# Patient Record
Sex: FEMALE | Race: WHITE | NOT HISPANIC OR LATINO | Employment: FULL TIME | URBAN - METROPOLITAN AREA
[De-identification: names, ages, dates, MRNs, and addresses within clinical notes are randomized per-mention and may not be internally consistent; named-entity substitution may affect disease eponyms.]

---

## 2020-06-04 DIAGNOSIS — K64.9 HEMORRHOIDS, UNSPECIFIED HEMORRHOID TYPE: Primary | ICD-10-CM

## 2020-06-04 RX ORDER — HYDROCORTISONE ACETATE 25 MG/1
25 SUPPOSITORY RECTAL 2 TIMES DAILY
Qty: 24 SUPPOSITORY | Refills: 0 | Status: SHIPPED | OUTPATIENT
Start: 2020-06-04 | End: 2021-06-04

## 2020-06-04 NOTE — TELEPHONE ENCOUNTER
PT C/O VERY PAINFUL HEMORRHOIDS. STATES SHE CANNOT SLEEP, OR SIT.  PT WANTS MEDICATION CALLED OUT ASAP.

## 2020-12-18 ENCOUNTER — TELEPHONE (OUTPATIENT)
Dept: OBSTETRICS AND GYNECOLOGY | Facility: CLINIC | Age: 35
End: 2020-12-18

## 2020-12-18 NOTE — TELEPHONE ENCOUNTER
----- Message from Zoë Kahn sent at 12/17/2020  2:47 PM CST -----  Regarding: medication  Contact: Patient  Patient would like a call back concerning  having trouble ovulating, please call to advise at  [phone]

## 2020-12-18 NOTE — TELEPHONE ENCOUNTER
Trying to get pregnant x 6 months.  Pt. Has been doing ovulation kit x 2 months and is stating she is not ovulating.  Is there anything her  needs to be doing.  He use to take Testosterone Injections, but does not any more.  Should he get back on Testosterone?  Please advise.

## 2020-12-23 ENCOUNTER — TELEPHONE (OUTPATIENT)
Dept: OBSTETRICS AND GYNECOLOGY | Facility: CLINIC | Age: 35
End: 2020-12-23

## 2020-12-23 DIAGNOSIS — N97.0 ANOVULATION: Primary | ICD-10-CM

## 2020-12-23 NOTE — TELEPHONE ENCOUNTER
PATIENT NOTIFIED  DR GROSS HAS BEEN CONSULTED REGARDIND ATTEMPTING PREGNANCY FOR SIX MONTHS  HE ORDERS  21 DAY PROGESTERONE AND  SEMEN ANALYSIS    DISCUSSED WITH PATIENT  BOTH ORDERS WILL BE SENT TO THE  [facility]

## 2021-01-12 LAB
MULTIPLE ORDERS: NORMAL
PROGEST SERPL-MCNC: 20.6 NG/ML

## 2021-02-11 ENCOUNTER — TELEPHONE (OUTPATIENT)
Dept: OBSTETRICS AND GYNECOLOGY | Facility: CLINIC | Age: 36
End: 2021-02-11

## 2021-02-12 ENCOUNTER — TELEPHONE (OUTPATIENT)
Dept: OBSTETRICS AND GYNECOLOGY | Facility: CLINIC | Age: 36
End: 2021-02-12

## 2021-04-08 ENCOUNTER — OFFICE VISIT (OUTPATIENT)
Dept: OBSTETRICS AND GYNECOLOGY | Facility: CLINIC | Age: 36
End: 2021-04-08
Payer: COMMERCIAL

## 2021-04-08 VITALS — HEART RATE: 84 BPM | SYSTOLIC BLOOD PRESSURE: 115 MMHG | WEIGHT: 150 LBS | DIASTOLIC BLOOD PRESSURE: 75 MMHG

## 2021-04-08 DIAGNOSIS — Z01.419 WELL WOMAN EXAM WITH ROUTINE GYNECOLOGICAL EXAM: Primary | ICD-10-CM

## 2021-04-08 PROCEDURE — 99395 PREV VISIT EST AGE 18-39: CPT | Mod: S$GLB,,, | Performed by: OBSTETRICS & GYNECOLOGY

## 2021-04-08 PROCEDURE — 99395 PR PREVENTIVE VISIT,EST,18-39: ICD-10-PCS | Mod: S$GLB,,, | Performed by: OBSTETRICS & GYNECOLOGY

## 2021-10-07 DIAGNOSIS — B96.89 BV (BACTERIAL VAGINOSIS): Primary | ICD-10-CM

## 2021-10-07 DIAGNOSIS — N76.0 BV (BACTERIAL VAGINOSIS): Primary | ICD-10-CM

## 2021-10-07 RX ORDER — TINIDAZOLE 500 MG/1
2 TABLET ORAL
Qty: 8 TABLET | Refills: 0 | Status: SHIPPED | OUTPATIENT
Start: 2021-10-07 | End: 2021-10-09

## 2021-11-29 DIAGNOSIS — N76.0 BV (BACTERIAL VAGINOSIS): Primary | ICD-10-CM

## 2021-11-29 DIAGNOSIS — B96.89 BV (BACTERIAL VAGINOSIS): Primary | ICD-10-CM

## 2021-11-30 RX ORDER — TINIDAZOLE 500 MG/1
2 TABLET ORAL
Qty: 8 TABLET | Refills: 0 | Status: SHIPPED | OUTPATIENT
Start: 2021-11-30 | End: 2021-12-02

## 2022-04-13 DIAGNOSIS — N89.8 VAGINAL ITCHING: Primary | ICD-10-CM

## 2022-04-13 RX ORDER — IBREXAFUNGERP 150 MG/1
300 TABLET, FILM COATED ORAL 2 TIMES DAILY
Qty: 4 TABLET | Refills: 0 | Status: SHIPPED | OUTPATIENT
Start: 2022-04-13 | End: 2022-04-14

## 2022-12-30 ENCOUNTER — OFFICE VISIT (OUTPATIENT)
Dept: OBSTETRICS AND GYNECOLOGY | Facility: CLINIC | Age: 37
End: 2022-12-30
Payer: COMMERCIAL

## 2022-12-30 VITALS — WEIGHT: 151 LBS | HEART RATE: 92 BPM | SYSTOLIC BLOOD PRESSURE: 131 MMHG | DIASTOLIC BLOOD PRESSURE: 92 MMHG

## 2022-12-30 DIAGNOSIS — F41.9 ANXIETY: ICD-10-CM

## 2022-12-30 DIAGNOSIS — Z01.419 WELL WOMAN EXAM WITH ROUTINE GYNECOLOGICAL EXAM: Primary | ICD-10-CM

## 2022-12-30 DIAGNOSIS — R63.5 WEIGHT GAIN: ICD-10-CM

## 2022-12-30 PROCEDURE — 1159F MED LIST DOCD IN RCRD: CPT | Mod: CPTII,S$GLB,, | Performed by: OBSTETRICS & GYNECOLOGY

## 2022-12-30 PROCEDURE — 99395 PR PREVENTIVE VISIT,EST,18-39: ICD-10-PCS | Mod: S$GLB,,, | Performed by: OBSTETRICS & GYNECOLOGY

## 2022-12-30 PROCEDURE — 3075F SYST BP GE 130 - 139MM HG: CPT | Mod: CPTII,S$GLB,, | Performed by: OBSTETRICS & GYNECOLOGY

## 2022-12-30 PROCEDURE — 3080F PR MOST RECENT DIASTOLIC BLOOD PRESSURE >= 90 MM HG: ICD-10-PCS | Mod: CPTII,S$GLB,, | Performed by: OBSTETRICS & GYNECOLOGY

## 2022-12-30 PROCEDURE — 1159F PR MEDICATION LIST DOCUMENTED IN MEDICAL RECORD: ICD-10-PCS | Mod: CPTII,S$GLB,, | Performed by: OBSTETRICS & GYNECOLOGY

## 2022-12-30 PROCEDURE — 3075F PR MOST RECENT SYSTOLIC BLOOD PRESS GE 130-139MM HG: ICD-10-PCS | Mod: CPTII,S$GLB,, | Performed by: OBSTETRICS & GYNECOLOGY

## 2022-12-30 PROCEDURE — 3080F DIAST BP >= 90 MM HG: CPT | Mod: CPTII,S$GLB,, | Performed by: OBSTETRICS & GYNECOLOGY

## 2022-12-30 PROCEDURE — 99395 PREV VISIT EST AGE 18-39: CPT | Mod: S$GLB,,, | Performed by: OBSTETRICS & GYNECOLOGY

## 2022-12-30 RX ORDER — PHENTERMINE AND TOPIRAMATE 7.5; 46 MG/1; MG/1
1 CAPSULE, EXTENDED RELEASE ORAL EVERY MORNING
Qty: 30 CAPSULE | Refills: 5 | Status: SHIPPED | OUTPATIENT
Start: 2022-12-30 | End: 2023-01-29

## 2022-12-30 RX ORDER — ALPRAZOLAM 0.5 MG/1
0.5 TABLET ORAL 3 TIMES DAILY PRN
Qty: 28 TABLET | Refills: 0 | Status: SHIPPED | OUTPATIENT
Start: 2022-12-30 | End: 2023-12-30

## 2022-12-30 RX ORDER — PHENTERMINE AND TOPIRAMATE 3.75; 23 MG/1; MG/1
1 CAPSULE, EXTENDED RELEASE ORAL DAILY
Qty: 14 CAPSULE | Refills: 0 | Status: SHIPPED | OUTPATIENT
Start: 2022-12-30 | End: 2023-01-13

## 2022-12-30 NOTE — PROGRESS NOTES
Subjective:       Patient ID: Mariam Lee is a 37 y.o. female.    Chief Complaint:  Well Woman      History of Present Illness  She is doing well.  No new health issues,  No abnormal bleeding, No GI or Gu concerns,  No dyspareunia  She is doing well, her cycles are regular but alternate with heaviness and lightness. Her  has been on testosterone for many years. She is concerned with weight gain despite working out and dieting consistently. Qsymia discussed as option to assist with weight loss  .   HPI      GYN & OB History  Patient's last menstrual period was 12/15/2022.     Date of Last Pap: No result found    OB History    Para Term  AB Living   2 1           SAB IAB Ectopic Multiple Live Births                  # Outcome Date GA Lbr Emmett/2nd Weight Sex Delivery Anes PTL Lv   2             1 Para                Review of Systems  Review of Systems   Constitutional:  Negative for activity change.   Eyes:  Negative for visual disturbance.   Respiratory:  Negative for shortness of breath.    Cardiovascular:  Negative for chest pain.   Gastrointestinal:  Negative for abdominal pain.   Genitourinary:  Negative for vaginal bleeding.        No abnormal vaginal bleeding   Musculoskeletal:  Negative for back pain.   Integumentary:  Negative for rash and breast mass.   Neurological:  Negative for numbness.   Psychiatric/Behavioral:          No mood disturbance or changes    Breast: Negative for mass          Objective:    Physical Exam:   Constitutional: She is oriented to person, place, and time. She appears well-developed. She is cooperative.    HENT:   Head: Normocephalic.     Neck: Trachea normal. No thyromegaly present.    Cardiovascular:  Normal rate, regular rhythm and normal heart sounds.             Pulmonary/Chest: Effort normal and breath sounds normal. Right breast exhibits no mass, no nipple discharge and no skin change. Left breast exhibits no mass, no nipple discharge and no  skin change.        Abdominal: Soft. There is no abdominal tenderness. There is no rebound and no guarding.     Genitourinary:    Vagina, uterus, right adnexa and left adnexa normal.      Pelvic exam was performed with patient supine.   Labial bartholins normal.There is no lesion on the right labia. There is no lesion on the left labia. Cervix is normal. Right adnexum displays no mass and no tenderness. Left adnexum displays no mass and no tenderness. Cervix exhibits no discharge. Uterus is not enlarged and not tender.              Lymphadenopathy:        Head (right side): No submental and no submandibular adenopathy present.        Head (left side): No submental and no submandibular adenopathy present.     She has no cervical adenopathy.    Neurological: She is alert and oriented to person, place, and time.    Skin: Skin is warm.    Psychiatric: She has a normal mood and affect. Her speech is normal and behavior is normal. Thought content normal.        Assessment:        1. Well woman exam with routine gynecological exam    2. Weight gain    3. Anxiety                 Plan:             Pap discussed will return for her annual     Pt is aware we call all results. If she does not hear from our office regarding her result within a week of having a study or procedure performed she is to call the office so that we can research the result for her.  Birth control discussed  Chaperone was present

## 2023-01-03 LAB — Lab: NORMAL

## 2023-01-04 DIAGNOSIS — N76.0 BACTERIAL VAGINOSIS: Primary | ICD-10-CM

## 2023-01-04 DIAGNOSIS — B96.89 BACTERIAL VAGINOSIS: Primary | ICD-10-CM

## 2023-01-04 RX ORDER — TINIDAZOLE 500 MG/1
2 TABLET ORAL
Qty: 8 TABLET | Refills: 0 | Status: SHIPPED | OUTPATIENT
Start: 2023-01-04 | End: 2023-01-06

## 2023-01-04 NOTE — TELEPHONE ENCOUNTER
----- Message from Harshil Caballero III, MD sent at 1/4/2023  2:44 PM CST -----  I want to let you know that your pap smear is normal   She has a BV and needs tindamax

## 2023-01-04 NOTE — TELEPHONE ENCOUNTER
Current BV infection. Allergies reviewed. Pharmacy up to date. Medication pending. Please approve.

## 2023-01-18 DIAGNOSIS — Z01.419 WELL WOMAN EXAM WITH ROUTINE GYNECOLOGICAL EXAM: Primary | ICD-10-CM

## 2023-01-23 DIAGNOSIS — R35.0 URINARY FREQUENCY: Primary | ICD-10-CM

## 2023-01-23 RX ORDER — NITROFURANTOIN 25; 75 MG/1; MG/1
100 CAPSULE ORAL 2 TIMES DAILY
Qty: 14 CAPSULE | Refills: 0 | Status: SHIPPED | OUTPATIENT
Start: 2023-01-23 | End: 2023-01-30

## 2023-01-23 NOTE — TELEPHONE ENCOUNTER
C/o urinary urgency and frequency. Sent order to path lab and advised pt that we would send out antibiotics for her to start after she leaves the sample.

## 2023-01-25 DIAGNOSIS — N76.0 ACUTE VAGINITIS: Primary | ICD-10-CM

## 2023-01-25 DIAGNOSIS — N89.8 VAGINAL DISCHARGE: ICD-10-CM

## 2023-01-25 DIAGNOSIS — N76.0 ACUTE VAGINITIS: ICD-10-CM

## 2023-01-25 DIAGNOSIS — R30.0 DYSURIA: Primary | ICD-10-CM

## 2023-01-25 RX ORDER — FLUCONAZOLE 150 MG/1
150 TABLET ORAL EVERY OTHER DAY
Qty: 2 TABLET | Refills: 2 | Status: SHIPPED | OUTPATIENT
Start: 2023-01-25 | End: 2023-01-28

## 2023-01-27 LAB
AMORPH URATE CRY URNS QL MICRO: ABNORMAL
BACTERIA #/AREA URNS HPF: ABNORMAL /[HPF]
BILIRUB UR QL STRIP: NEGATIVE
CHLAMYDIA: NEGATIVE
CLARITY UR: ABNORMAL
COLOR UR: YELLOW
EPITHELIAL CELLS: ABNORMAL
GLUCOSE (UA): NEGATIVE MG/DL
GONORRHEA: NEGATIVE
KETONES UR QL STRIP: 15 MG/DL
LEUKOCYTE ESTERASE UR QL STRIP: ABNORMAL
MUCOUS THREADS URNS QL MICRO: NEGATIVE
NITRITE UR QL STRIP: POSITIVE
OCCULT BLOOD: ABNORMAL
PH, URINE: 5 (ref 5–7.5)
PROT UR QL STRIP: 75 MG/DL
RBC/HPF: ABNORMAL
SOURCE: NORMAL
SP GR UR STRIP: 1.02 (ref 1–1.03)
URINE CULTURE, ROUTINE: NORMAL
UROBILINOGEN, URINE: NORMAL E.U./DL (ref 0–1)
WBC/HPF: ABNORMAL

## 2023-01-31 ENCOUNTER — OFFICE VISIT (OUTPATIENT)
Dept: OBSTETRICS AND GYNECOLOGY | Facility: CLINIC | Age: 38
End: 2023-01-31
Payer: COMMERCIAL

## 2023-01-31 VITALS — DIASTOLIC BLOOD PRESSURE: 103 MMHG | HEART RATE: 81 BPM | WEIGHT: 146 LBS | SYSTOLIC BLOOD PRESSURE: 142 MMHG

## 2023-01-31 DIAGNOSIS — N89.8 VAGINAL DISCHARGE: Primary | ICD-10-CM

## 2023-01-31 DIAGNOSIS — N76.0 ACUTE VAGINITIS: ICD-10-CM

## 2023-01-31 PROCEDURE — 99213 OFFICE O/P EST LOW 20 MIN: CPT | Mod: S$GLB,,, | Performed by: OBSTETRICS & GYNECOLOGY

## 2023-01-31 PROCEDURE — 99213 PR OFFICE/OUTPT VISIT, EST, LEVL III, 20-29 MIN: ICD-10-PCS | Mod: S$GLB,,, | Performed by: OBSTETRICS & GYNECOLOGY

## 2023-01-31 PROCEDURE — 3077F PR MOST RECENT SYSTOLIC BLOOD PRESSURE >= 140 MM HG: ICD-10-PCS | Mod: CPTII,S$GLB,, | Performed by: OBSTETRICS & GYNECOLOGY

## 2023-01-31 PROCEDURE — 1159F MED LIST DOCD IN RCRD: CPT | Mod: CPTII,S$GLB,, | Performed by: OBSTETRICS & GYNECOLOGY

## 2023-01-31 PROCEDURE — 3077F SYST BP >= 140 MM HG: CPT | Mod: CPTII,S$GLB,, | Performed by: OBSTETRICS & GYNECOLOGY

## 2023-01-31 PROCEDURE — 3080F PR MOST RECENT DIASTOLIC BLOOD PRESSURE >= 90 MM HG: ICD-10-PCS | Mod: CPTII,S$GLB,, | Performed by: OBSTETRICS & GYNECOLOGY

## 2023-01-31 PROCEDURE — 1159F PR MEDICATION LIST DOCUMENTED IN MEDICAL RECORD: ICD-10-PCS | Mod: CPTII,S$GLB,, | Performed by: OBSTETRICS & GYNECOLOGY

## 2023-01-31 PROCEDURE — 3080F DIAST BP >= 90 MM HG: CPT | Mod: CPTII,S$GLB,, | Performed by: OBSTETRICS & GYNECOLOGY

## 2023-01-31 RX ORDER — FLUCONAZOLE 150 MG/1
150 TABLET ORAL DAILY
Qty: 1 TABLET | Refills: 0 | Status: SHIPPED | OUTPATIENT
Start: 2023-01-31 | End: 2023-02-01

## 2023-01-31 RX ORDER — CLINDAMYCIN PHOSPHATE 100 MG/5G
CREAM VAGINAL 2 TIMES DAILY
Qty: 5 G | Refills: 1 | Status: SHIPPED | OUTPATIENT
Start: 2023-01-31 | End: 2023-03-07

## 2023-01-31 NOTE — PROGRESS NOTES
Subjective:       Patient ID: Mariam Lee is a 37 y.o. female.    Chief Complaint:  Vaginal Pain (PATIENT C/O BV, YEAST INFECTION, AND UTI RECENTLY. SHE IS STILL EXPERIENCING SYMPTOMS AND STATES SHE HAS TAKEN MEDICATION TO TREAT ALL. )      History of Present Illness  She is here to evaluate for recurrent yeast and BV   she has       GYN & OB History  Patient's last menstrual period was 01/15/2023.     Date of Last Pap: No result found    OB History    Para Term  AB Living   2 1           SAB IAB Ectopic Multiple Live Births                  # Outcome Date GA Lbr Emmett/2nd Weight Sex Delivery Anes PTL Lv   2             1 Para                Review of Systems  Review of Systems          Objective:    Physical Exam:   Constitutional: She appears well-developed.               Genitourinary:    Uterus normal.      Pelvic exam was performed with patient supine.   Labial bartholins normal.There is no tenderness or lesion on the right labia. There is no tenderness or lesion on the left labia. Cervix is normal. There is vaginal discharge in the vagina.               Neurological: She is alert.          Assessment:      No diagnosis found.             Plan:             Pap discussed will return for her annual     Pt is aware we call all results. If she does not hear from our office regarding her result within a week of having a study or procedure performed she is to call the office so that we can research the result for her.  Discussed follow up.  She is to rtn if her symptoms do not resolve or if persist  Chaperone was present

## 2023-02-01 ENCOUNTER — TELEPHONE (OUTPATIENT)
Dept: OBSTETRICS AND GYNECOLOGY | Facility: CLINIC | Age: 38
End: 2023-02-01

## 2023-02-01 RX ORDER — CLINDAMYCIN PHOSPHATE 100 MG/5G
CREAM VAGINAL 2 TIMES DAILY
Qty: 5 G | Refills: 0 | Status: CANCELLED | OUTPATIENT
Start: 2023-02-01

## 2023-02-01 RX ORDER — CLINDAMYCIN PHOSPHATE 100 MG/5G
CREAM VAGINAL 2 TIMES DAILY
Qty: 5 G | Refills: 1 | Status: CANCELLED | OUTPATIENT
Start: 2023-02-01

## 2023-02-03 DIAGNOSIS — A49.8 E COLI INFECTION: Primary | ICD-10-CM

## 2023-02-03 DIAGNOSIS — N76.0 BACTERIAL VAGINOSIS: ICD-10-CM

## 2023-02-03 DIAGNOSIS — B96.89 BACTERIAL VAGINOSIS: ICD-10-CM

## 2023-02-03 LAB — CULTURE: NORMAL

## 2023-02-03 RX ORDER — FLUCONAZOLE 150 MG/1
150 TABLET ORAL EVERY OTHER DAY
Qty: 2 TABLET | Refills: 2 | Status: SHIPPED | OUTPATIENT
Start: 2023-02-03 | End: 2023-02-06

## 2023-02-03 RX ORDER — METRONIDAZOLE 500 MG/1
500 TABLET ORAL EVERY 12 HOURS
Qty: 14 TABLET | Refills: 0 | Status: SHIPPED | OUTPATIENT
Start: 2023-02-03 | End: 2023-02-10

## 2023-02-03 NOTE — TELEPHONE ENCOUNTER
Current E. Coli and BV infection.  Allergies reviewed. Pharmacy up to date. Medication pending. Please approve.

## 2023-02-07 DIAGNOSIS — A49.8 ESCHERICHIA COLI (E. COLI) INFECTION: Primary | ICD-10-CM

## 2023-02-07 RX ORDER — CIPROFLOXACIN 500 MG/1
500 TABLET ORAL EVERY 12 HOURS
Qty: 14 TABLET | Refills: 0 | Status: SHIPPED | OUTPATIENT
Start: 2023-02-07 | End: 2023-02-14

## 2023-02-07 NOTE — TELEPHONE ENCOUNTER
----- Message from Priyanka Ying MA sent at 2/7/2023 10:55 AM CST -----  Patient said to send out the cipro 500mg BID to her regular pharmacy because she will be back home tomorrow. [facility] [address] please

## 2023-02-09 ENCOUNTER — TELEPHONE (OUTPATIENT)
Dept: OBSTETRICS AND GYNECOLOGY | Facility: CLINIC | Age: 38
End: 2023-02-09
Payer: COMMERCIAL

## 2023-02-09 NOTE — TELEPHONE ENCOUNTER
Spoke to patient. She is still having a lot of vaginal irritation and itching. She just picked up the cipro today and will start that. She did not get the clinda cream because she does not currently have health insurance coverage. She will use otc monistat. Patient advised to stop the miconazole.

## 2023-03-07 ENCOUNTER — OFFICE VISIT (OUTPATIENT)
Dept: OBSTETRICS AND GYNECOLOGY | Facility: CLINIC | Age: 38
End: 2023-03-07
Payer: COMMERCIAL

## 2023-03-07 VITALS — WEIGHT: 142 LBS | HEART RATE: 76 BPM | SYSTOLIC BLOOD PRESSURE: 133 MMHG | DIASTOLIC BLOOD PRESSURE: 81 MMHG

## 2023-03-07 DIAGNOSIS — N76.1 CHRONIC VAGINITIS: Primary | ICD-10-CM

## 2023-03-07 PROCEDURE — 1159F PR MEDICATION LIST DOCUMENTED IN MEDICAL RECORD: ICD-10-PCS | Mod: CPTII,S$GLB,, | Performed by: OBSTETRICS & GYNECOLOGY

## 2023-03-07 PROCEDURE — 99213 PR OFFICE/OUTPT VISIT, EST, LEVL III, 20-29 MIN: ICD-10-PCS | Mod: S$GLB,,, | Performed by: OBSTETRICS & GYNECOLOGY

## 2023-03-07 PROCEDURE — 3079F DIAST BP 80-89 MM HG: CPT | Mod: CPTII,S$GLB,, | Performed by: OBSTETRICS & GYNECOLOGY

## 2023-03-07 PROCEDURE — 1159F MED LIST DOCD IN RCRD: CPT | Mod: CPTII,S$GLB,, | Performed by: OBSTETRICS & GYNECOLOGY

## 2023-03-07 PROCEDURE — 3075F PR MOST RECENT SYSTOLIC BLOOD PRESS GE 130-139MM HG: ICD-10-PCS | Mod: CPTII,S$GLB,, | Performed by: OBSTETRICS & GYNECOLOGY

## 2023-03-07 PROCEDURE — 3075F SYST BP GE 130 - 139MM HG: CPT | Mod: CPTII,S$GLB,, | Performed by: OBSTETRICS & GYNECOLOGY

## 2023-03-07 PROCEDURE — 99213 OFFICE O/P EST LOW 20 MIN: CPT | Mod: S$GLB,,, | Performed by: OBSTETRICS & GYNECOLOGY

## 2023-03-07 PROCEDURE — 3079F PR MOST RECENT DIASTOLIC BLOOD PRESSURE 80-89 MM HG: ICD-10-PCS | Mod: CPTII,S$GLB,, | Performed by: OBSTETRICS & GYNECOLOGY

## 2023-03-07 RX ORDER — FLUCONAZOLE 150 MG/1
150 TABLET ORAL DAILY
Qty: 4 TABLET | Refills: 0 | Status: SHIPPED | OUTPATIENT
Start: 2023-03-07 | End: 2023-03-11

## 2023-03-07 RX ORDER — METRONIDAZOLE 500 MG/1
500 TABLET ORAL EVERY 12 HOURS
Qty: 14 TABLET | Refills: 0 | Status: SHIPPED | OUTPATIENT
Start: 2023-03-07 | End: 2023-03-14

## 2023-03-07 NOTE — PROGRESS NOTES
Subjective:       Patient ID: Mariam Lee is a 37 y.o. female.    Chief Complaint:  Vaginitis (PATIENT C/O RECURRENT VAGINAL INFECTION. SHE HAS BEEN TREATED WITH MEDICATION AND NOTHING SEEMS TO CLEAR HER SYMPTOMS UP COMPLETELY. )      History of Present Illness  She is here to evaluate chronic vaginitis. She reports a terrible smell after her period ends. She has tried boric acid suppositories, clarivee, cipro with no improvement      GYN & OB History  Patient's last menstrual period was 2023.     Date of Last Pap: No result found    OB History    Para Term  AB Living   2 1           SAB IAB Ectopic Multiple Live Births                  # Outcome Date GA Lbr Emmett/2nd Weight Sex Delivery Anes PTL Lv   2             1 Para                Review of Systems  Review of Systems   Constitutional:  Negative for activity change.   Eyes:  Negative for visual disturbance.   Respiratory:  Negative for shortness of breath.    Cardiovascular:  Negative for chest pain.   Gastrointestinal:  Negative for abdominal pain.   Genitourinary:  Positive for vaginal discharge and vaginal odor. Negative for vaginal bleeding.        No abnormal vaginal bleeding   Musculoskeletal:  Negative for back pain.   Integumentary:  Negative for rash and breast mass.   Neurological:  Negative for numbness.   Psychiatric/Behavioral:          No mood disturbance or changes    Breast: Negative for mass          Objective:    Physical Exam:   Constitutional: Vital signs are normal.             Abdominal: Soft. She exhibits no distension. There is no abdominal tenderness.     Genitourinary:    Uterus normal.   Labial bartholins normal.There is no rash or tenderness on the right labia. There is no rash or tenderness on the left labia. There is vaginal discharge (thin, grey) in the vagina. No bleeding in the vagina. Cervix exhibits no discharge and no friability. Uterus is not enlarged.                      Assessment:        1.  Chronic vaginitis                 Plan:           One swab & aerobic culture   Pap discussed will return for her annual     Pt is aware we call all results. If she does not hear from our office regarding her result within a week of having a study or procedure performed she is to call the office so that we can research the result for her.  Discussed follow up.  She is to rtn if her symptoms do not resolve or if persist  Chaperone was present

## 2023-03-10 DIAGNOSIS — B37.31 YEAST VAGINITIS: Primary | ICD-10-CM

## 2023-03-11 LAB — CULTURE: NORMAL

## 2023-03-13 ENCOUNTER — OFFICE VISIT (OUTPATIENT)
Dept: OBSTETRICS AND GYNECOLOGY | Facility: CLINIC | Age: 38
End: 2023-03-13
Payer: COMMERCIAL

## 2023-03-13 VITALS — WEIGHT: 140 LBS | SYSTOLIC BLOOD PRESSURE: 122 MMHG | HEART RATE: 96 BPM | DIASTOLIC BLOOD PRESSURE: 83 MMHG

## 2023-03-13 DIAGNOSIS — A49.02 MRSA INFECTION: Primary | ICD-10-CM

## 2023-03-13 PROCEDURE — 1159F MED LIST DOCD IN RCRD: CPT | Mod: CPTII,S$GLB,, | Performed by: OBSTETRICS & GYNECOLOGY

## 2023-03-13 PROCEDURE — 3079F PR MOST RECENT DIASTOLIC BLOOD PRESSURE 80-89 MM HG: ICD-10-PCS | Mod: CPTII,S$GLB,, | Performed by: OBSTETRICS & GYNECOLOGY

## 2023-03-13 PROCEDURE — 1159F PR MEDICATION LIST DOCUMENTED IN MEDICAL RECORD: ICD-10-PCS | Mod: CPTII,S$GLB,, | Performed by: OBSTETRICS & GYNECOLOGY

## 2023-03-13 PROCEDURE — 3074F SYST BP LT 130 MM HG: CPT | Mod: CPTII,S$GLB,, | Performed by: OBSTETRICS & GYNECOLOGY

## 2023-03-13 PROCEDURE — 3074F PR MOST RECENT SYSTOLIC BLOOD PRESSURE < 130 MM HG: ICD-10-PCS | Mod: CPTII,S$GLB,, | Performed by: OBSTETRICS & GYNECOLOGY

## 2023-03-13 PROCEDURE — 3079F DIAST BP 80-89 MM HG: CPT | Mod: CPTII,S$GLB,, | Performed by: OBSTETRICS & GYNECOLOGY

## 2023-03-13 PROCEDURE — 99213 OFFICE O/P EST LOW 20 MIN: CPT | Mod: S$GLB,,, | Performed by: OBSTETRICS & GYNECOLOGY

## 2023-03-13 PROCEDURE — 99213 PR OFFICE/OUTPT VISIT, EST, LEVL III, 20-29 MIN: ICD-10-PCS | Mod: S$GLB,,, | Performed by: OBSTETRICS & GYNECOLOGY

## 2023-03-13 NOTE — PROGRESS NOTES
She took the cipro in feb    the MRSA is present but she feels so much better and reluctant to take an  ABX will come today to see if the culture shows that it has resolved

## 2023-03-14 NOTE — PROGRESS NOTES
Subjective:       Patient ID: Mariam Lee is a 37 y.o. female.    Chief Complaint:  Vaginitis      History of Present Illness  She is here to evaluate vaginitis   she had a positive MRSA   she wants to see if the bacteria has resolved       GYN & OB History  Patient's last menstrual period was 2023.     Date of Last Pap: No result found    OB History    Para Term  AB Living   2 1           SAB IAB Ectopic Multiple Live Births                  # Outcome Date GA Lbr Emmett/2nd Weight Sex Delivery Anes PTL Lv   2             1 Para                Review of Systems  Review of Systems          Objective:    Physical Exam:               Genitourinary:    Vagina, right adnexa and left adnexa normal.      Pelvic exam was performed with patient supine.   The external female genitalia was normal.   There is no lesion on the right labia. There is no lesion on the left labia. Cervix is normal. No  no vaginal discharge (cx preformed) in the vagina.                      Assessment:        1. MRSA infection                 Plan:           Culture preformed   Pt is aware we call all results. If she does not hear from our office regarding her result within a week of having a study or procedure performed she is to call the office so that we can research the result for her.  Discussed follow up.  She is to rtn if her symptoms do not resolve or if persist  Chaperone was present

## 2023-03-17 LAB — CULTURE: NORMAL

## 2023-03-17 RX ORDER — AZITHROMYCIN 500 MG/1
500 TABLET, FILM COATED ORAL DAILY
Qty: 5 TABLET | Refills: 0 | Status: SHIPPED | OUTPATIENT
Start: 2023-03-17 | End: 2023-03-22

## 2023-03-17 RX ORDER — AMOXICILLIN AND CLAVULANATE POTASSIUM 875; 125 MG/1; MG/1
1 TABLET, FILM COATED ORAL 2 TIMES DAILY
Qty: 14 TABLET | Refills: 0 | Status: SHIPPED | OUTPATIENT
Start: 2023-03-17 | End: 2023-03-24

## 2023-03-17 RX ORDER — FLUCONAZOLE 100 MG/1
100 TABLET ORAL DAILY
Qty: 7 TABLET | Refills: 0 | Status: SHIPPED | OUTPATIENT
Start: 2023-03-17 | End: 2023-03-24

## 2023-03-24 DIAGNOSIS — N76.0 VAGINAL INFECTION: Primary | ICD-10-CM

## 2023-03-24 RX ORDER — AZITHROMYCIN 500 MG/1
500 TABLET, FILM COATED ORAL DAILY
Qty: 5 TABLET | Refills: 0 | Status: SHIPPED | OUTPATIENT
Start: 2023-03-24 | End: 2024-03-14

## 2023-03-24 NOTE — TELEPHONE ENCOUNTER
Current vaginal infection. Patient was unable to get zithromax that was previously called she is requesting that we get it sent out again as she is leaving to go out of town today.   Allergies reviewed. Pharmacy up to date. Medication pending. Please approve.

## 2023-04-17 DIAGNOSIS — N76.1 CHRONIC VAGINITIS: Primary | ICD-10-CM

## 2023-04-17 RX ORDER — TINIDAZOLE 500 MG/1
2 TABLET ORAL
Qty: 8 TABLET | Refills: 0 | Status: SHIPPED | OUTPATIENT
Start: 2023-04-17 | End: 2023-04-19

## 2023-05-22 ENCOUNTER — TELEPHONE (OUTPATIENT)
Dept: OBSTETRICS AND GYNECOLOGY | Facility: CLINIC | Age: 38
End: 2023-05-22
Payer: COMMERCIAL

## 2023-05-22 NOTE — TELEPHONE ENCOUNTER
Patient called for work excuse due to recurrent vaginal infection. Excuse given for May, 2, 3, 4, 5, 12, & 19th.

## 2024-01-18 ENCOUNTER — OFFICE VISIT (OUTPATIENT)
Dept: OBSTETRICS AND GYNECOLOGY | Facility: CLINIC | Age: 39
End: 2024-01-18
Payer: COMMERCIAL

## 2024-01-18 VITALS — DIASTOLIC BLOOD PRESSURE: 82 MMHG | HEART RATE: 93 BPM | SYSTOLIC BLOOD PRESSURE: 139 MMHG | WEIGHT: 139 LBS

## 2024-01-18 DIAGNOSIS — Z01.419 WELL WOMAN EXAM WITH ROUTINE GYNECOLOGICAL EXAM: ICD-10-CM

## 2024-01-18 DIAGNOSIS — R87.612 LOW GRADE SQUAMOUS INTRAEPITHELIAL LESION ON CYTOLOGIC SMEAR OF CERVIX (LGSIL): ICD-10-CM

## 2024-01-18 DIAGNOSIS — F41.9 ANXIETY AND DEPRESSION: Primary | ICD-10-CM

## 2024-01-18 DIAGNOSIS — F32.A ANXIETY AND DEPRESSION: Primary | ICD-10-CM

## 2024-01-18 DIAGNOSIS — B96.89 BV (BACTERIAL VAGINOSIS): ICD-10-CM

## 2024-01-18 DIAGNOSIS — N76.0 BV (BACTERIAL VAGINOSIS): ICD-10-CM

## 2024-01-18 PROCEDURE — 3079F DIAST BP 80-89 MM HG: CPT | Mod: CPTII,S$GLB,, | Performed by: OBSTETRICS & GYNECOLOGY

## 2024-01-18 PROCEDURE — 3075F SYST BP GE 130 - 139MM HG: CPT | Mod: CPTII,S$GLB,, | Performed by: OBSTETRICS & GYNECOLOGY

## 2024-01-18 PROCEDURE — 99395 PREV VISIT EST AGE 18-39: CPT | Mod: S$GLB,,, | Performed by: OBSTETRICS & GYNECOLOGY

## 2024-01-18 PROCEDURE — 1159F MED LIST DOCD IN RCRD: CPT | Mod: CPTII,S$GLB,, | Performed by: OBSTETRICS & GYNECOLOGY

## 2024-01-18 RX ORDER — SERTRALINE HYDROCHLORIDE 50 MG/1
TABLET, FILM COATED ORAL
Qty: 30 TABLET | Refills: 2 | Status: SHIPPED | OUTPATIENT
Start: 2024-01-18

## 2024-01-18 RX ORDER — ALPRAZOLAM 0.5 MG/1
0.5 TABLET ORAL 3 TIMES DAILY PRN
Qty: 30 TABLET | Refills: 0 | Status: SHIPPED | OUTPATIENT
Start: 2024-01-18 | End: 2025-01-17

## 2024-01-18 NOTE — PROGRESS NOTES
Subjective:       Patient ID: Mariam Schaefer is a 38 y.o. female.    Chief Complaint:  Well Woman      History of Present Illness  She is doing well.  No new health issues,  No abnormal bleeding, No GI or Gu concerns,  No dyspareunia  She has occ BV no itching or burning but off her cycle she notes it.  She tried the clarivee but not a big difference.   Her anxiety is elevated she feels she needs to do something   she has had a difficult 3 years   but she is now crying everyday and does not sleep well  she feels off no SI or HI   she took xanax in the past but she is needing it more now.  She is with drawn and does not want to go out.  She finds it is worse with her cycle.  She had a panic attack in [place].    .   HPI      GYN & OB History  No LMP recorded.     Date of Last Pap: No result found    OB History    Para Term  AB Living   2 1           SAB IAB Ectopic Multiple Live Births                  # Outcome Date GA Lbr Emmett/2nd Weight Sex Delivery Anes PTL Lv   2             1 Para                Review of Systems  Review of Systems   Psychiatric/Behavioral:  The patient is nervous/anxious.              Objective:    Physical Exam:   Constitutional: She is oriented to person, place, and time. She appears well-developed. She is cooperative.    HENT:   Head: Normocephalic.     Neck: Trachea normal. No thyromegaly present.    Cardiovascular:  Normal rate, regular rhythm and normal heart sounds.             Pulmonary/Chest: Effort normal and breath sounds normal. Right breast exhibits no mass, no nipple discharge and no skin change. Left breast exhibits no mass, no nipple discharge and no skin change.        Abdominal: Soft. There is no abdominal tenderness. There is no rebound and no guarding.     Genitourinary:    Vagina and uterus normal.      Pelvic exam was performed with patient supine.     Labial bartholins normal.There is no lesion on the right labia. There is no lesion on the  left labia. Cervix is normal. Right adnexum displays no mass and no tenderness. Left adnexum displays no mass and no tenderness. Cervix exhibits no discharge. Uterus is not enlarged and not tender.              Lymphadenopathy:        Head (right side): No submental and no submandibular adenopathy present.        Head (left side): No submental and no submandibular adenopathy present.     She has no cervical adenopathy.    Neurological: She is alert and oriented to person, place, and time.    Skin: Skin is warm.    Psychiatric: She has a normal mood and affect. Her speech is normal and behavior is normal. Thought content normal.          Assessment:        1. Anxiety and depression    2. Well woman exam with routine gynecological exam    3. Low grade squamous intraepithelial lesion on cytologic smear of cervix (LGSIL)                 Plan:             Pap preformed    zoloft R&B discussed in detail    follow up 4 weeks discussed will return for her annual     Pt is aware we call all results. If she does not hear from our office regarding her result within a week of having a study or procedure performed she is to call the office so that we can research the result for her.  Birth control discussed  Chaperone was present

## 2024-01-23 LAB — Lab: NORMAL

## 2024-02-28 LAB
ABS NRBC COUNT: 0 X 10 3/UL (ref 0–0.01)
ABSOLUTE BASOPHIL: 0.07 X 10 3/UL (ref 0–0.22)
ABSOLUTE EOSINOPHIL: 0.29 X 10 3/UL (ref 0.04–0.54)
ABSOLUTE IMMATURE GRAN: 0.02 X 10 3/UL (ref 0–0.04)
ABSOLUTE LYMPHOCYTE: 2.6 X 10 3/UL (ref 0.86–4.75)
ABSOLUTE MONOCYTE: 0.72 X 10 3/UL (ref 0.22–1.08)
ALBUMIN SERPL-MCNC: 4.7 G/DL (ref 3.5–5.2)
ALBUMIN/GLOB SERPL ELPH: 1.3 {RATIO} (ref 1–2.7)
ALP ISOS SERPL LEV INH-CCNC: 63 U/L (ref 35–105)
ALT (SGPT): 17 U/L (ref 0–33)
AMORPH URATE CRY URNS QL MICRO: NEGATIVE
ANION GAP SERPL CALC-SCNC: 12 MMOL/L (ref 8–17)
AST SERPL-CCNC: 19 U/L (ref 0–32)
BACTERIA #/AREA URNS HPF: ABNORMAL /[HPF]
BASOPHILS NFR BLD: 0.9 % (ref 0.2–1.2)
BILIRUB UR QL STRIP: NEGATIVE
BILIRUBIN, TOTAL: 1.18 MG/DL (ref 0–1.2)
BUN/CREAT SERPL: 18.3 (ref 6–20)
CALCIUM SERPL-MCNC: 9.8 MG/DL (ref 8.6–10.2)
CARBON DIOXIDE, CO2: 24 MMOL/L (ref 22–29)
CHLORIDE: 103 MMOL/L (ref 98–107)
CLARITY UR: ABNORMAL
COLOR UR: YELLOW
CREAT SERPL-MCNC: 0.8 MG/DL (ref 0.5–0.9)
EOSINOPHIL NFR BLD: 3.6 % (ref 0.7–7)
EPITHELIAL CELLS: ABNORMAL
GFR ESTIMATION: 96.66 ML/MIN/1.73M2
GLOBULIN: 3.5 G/DL (ref 1.5–4.5)
GLUCOSE (UA): NEGATIVE MG/DL
GLUCOSE: 97 MG/DL (ref 74–106)
HCT VFR BLD AUTO: 37.2 % (ref 37–47)
HGB BLD-MCNC: 11.1 G/DL (ref 12–16)
IMMATURE GRANULOCYTES: 0.2 % (ref 0–0.5)
KETONES UR QL STRIP: NEGATIVE MG/DL
LEUKOCYTE ESTERASE UR QL STRIP: ABNORMAL
LYMPHOCYTES NFR BLD: 32 % (ref 19.3–53.1)
MCH RBC QN AUTO: 22.5 PG (ref 27–32)
MCHC RBC AUTO-ENTMCNC: 29.8 G/DL (ref 32–36)
MCV RBC AUTO: 75.5 FL (ref 82–100)
MONOCYTES NFR BLD: 8.9 % (ref 4.7–12.5)
MUCOUS THREADS URNS QL MICRO: ABNORMAL
NEUTROPHILS # BLD AUTO: 4.42 X 10 3/UL (ref 2.15–7.56)
NEUTROPHILS NFR BLD: 54.4 % (ref 34–71.1)
NITRITE UR QL STRIP: NEGATIVE
NUCLEATED RED BLOOD CELLS: 0 /100 WBC (ref 0–0.2)
OCCULT BLOOD: NEGATIVE
PH, URINE: 7 (ref 5–7.5)
PLATELET # BLD AUTO: 327 X 10 3/UL (ref 135–400)
POTASSIUM: 5.7 MMOL/L (ref 3.5–5.1)
PROT SNV-MCNC: 8.2 G/DL (ref 6.4–8.3)
PROT UR QL STRIP: NEGATIVE MG/DL
RBC # BLD AUTO: 4.93 X 10 6/UL (ref 4.2–5.4)
RBC/HPF: NEGATIVE
RDW-SD: 44.2 FL (ref 37–54)
SODIUM: 139 MMOL/L (ref 136–145)
SP GR UR STRIP: 1 (ref 1–1.03)
T4, FREE: 1.15 NG/DL (ref 0.93–1.7)
TSH SERPL DL<=0.005 MIU/L-ACNC: 2.83 UIU/ML (ref 0.27–4.2)
UREA NITROGEN (BUN): 14.6 MG/DL (ref 6–20)
UROBILINOGEN, URINE: NORMAL E.U./DL (ref 0–1)
VITAMIN D (25OHD): 36.8 NG/ML
WBC # BLD: 8.12 X 10 3/UL (ref 4.3–10.8)
WBC/HPF: ABNORMAL

## 2024-03-08 ENCOUNTER — TELEPHONE (OUTPATIENT)
Dept: OBSTETRICS AND GYNECOLOGY | Facility: CLINIC | Age: 39
End: 2024-03-08
Payer: COMMERCIAL

## 2024-03-08 NOTE — TELEPHONE ENCOUNTER
Patient verbalized understanding of lab work. She has some iron coming on Saturday since she has low iron.

## 2024-03-08 NOTE — TELEPHONE ENCOUNTER
----- Message from Harshil Caballero III, MD sent at 3/1/2024 12:52 PM CST -----  Please call her labs look ok   she does need iron ,  her urine looks like a contaminant  her vit d is normal her thyroid is normal and her pap is normal

## 2024-03-11 DIAGNOSIS — N91.2 AMENORRHEA: Primary | ICD-10-CM

## 2024-03-11 LAB — B-HCG SERPL-ACNC: NORMAL MIU/ML

## 2024-03-12 ENCOUNTER — TELEPHONE (OUTPATIENT)
Dept: OBSTETRICS AND GYNECOLOGY | Facility: CLINIC | Age: 39
End: 2024-03-12
Payer: COMMERCIAL

## 2024-03-12 DIAGNOSIS — Z34.90 PREGNANCY, UNSPECIFIED GESTATIONAL AGE: Primary | ICD-10-CM

## 2024-03-14 ENCOUNTER — CLINICAL SUPPORT (OUTPATIENT)
Dept: OBSTETRICS AND GYNECOLOGY | Facility: CLINIC | Age: 39
End: 2024-03-14
Payer: COMMERCIAL

## 2024-03-14 VITALS — DIASTOLIC BLOOD PRESSURE: 70 MMHG | HEART RATE: 80 BPM | WEIGHT: 140 LBS | SYSTOLIC BLOOD PRESSURE: 120 MMHG

## 2024-03-14 DIAGNOSIS — N39.0 URINARY TRACT INFECTION WITHOUT HEMATURIA, SITE UNSPECIFIED: ICD-10-CM

## 2024-03-14 DIAGNOSIS — N39.0 URINARY TRACT INFECTION WITHOUT HEMATURIA, SITE UNSPECIFIED: Primary | ICD-10-CM

## 2024-03-14 DIAGNOSIS — Z3A.01 LESS THAN 8 WEEKS GESTATION OF PREGNANCY: Primary | ICD-10-CM

## 2024-03-14 RX ORDER — CEPHALEXIN 500 MG/1
500 CAPSULE ORAL 3 TIMES DAILY
Qty: 21 CAPSULE | Refills: 0 | Status: SHIPPED | OUTPATIENT
Start: 2024-03-14 | End: 2024-03-21

## 2024-03-14 NOTE — PROGRESS NOTES
OB INTAKE  OB labs discussed with patient  Prenatal Genetic Testing encouraged  She will have UNITY Complete at 11 weeks   Denies vaginal bleeding she does have some cramping  She does have a UTI will treat with Keflex

## 2024-03-15 ENCOUNTER — PATIENT MESSAGE (OUTPATIENT)
Dept: OBSTETRICS AND GYNECOLOGY | Facility: CLINIC | Age: 39
End: 2024-03-15
Payer: COMMERCIAL

## 2024-03-15 LAB
ABS NRBC COUNT: 0 X 10 3/UL (ref 0–0.01)
ABSOLUTE BASOPHIL: 0.06 X 10 3/UL (ref 0–0.22)
ABSOLUTE EOSINOPHIL: 0.2 X 10 3/UL (ref 0.04–0.54)
ABSOLUTE IMMATURE GRAN: 0.02 X 10 3/UL (ref 0–0.04)
ABSOLUTE LYMPHOCYTE: 2.16 X 10 3/UL (ref 0.86–4.75)
ABSOLUTE MONOCYTE: 0.6 X 10 3/UL (ref 0.22–1.08)
AMORPH URATE CRY URNS QL MICRO: NEGATIVE
ANTIBODY SCREEN: NEGATIVE
B-HCG SERPL-ACNC: NORMAL MIU/ML
BACTERIA #/AREA URNS HPF: ABNORMAL /[HPF]
BASOPHILS NFR BLD: 0.9 % (ref 0.2–1.2)
BILIRUB UR QL STRIP: NEGATIVE
BLOOD GROUPING: NORMAL
BLOOD TYPE (D): NEGATIVE
CLARITY UR: ABNORMAL
COLOR UR: YELLOW
EOSINOPHIL NFR BLD: 3 % (ref 0.7–7)
EPITHELIAL CELLS: ABNORMAL
GLUCOSE (UA): NEGATIVE MG/DL
HBV SURFACE AG SERPL QL IA: NONREACTIVE
HCT VFR BLD AUTO: 33.7 % (ref 37–47)
HCV IGG SERPL QL IA: NONREACTIVE
HGB BLD-MCNC: 10.3 G/DL (ref 12–16)
HIV 1+2 AB+HIV1 P24 AG SERPL QL IA: NONREACTIVE
HSV1 IGG SER-ACNC: NONREACTIVE
HSV2 IGG SER-ACNC: NONREACTIVE
IMMATURE GRANULOCYTES: 0.3 % (ref 0–0.5)
KETONES UR QL STRIP: ABNORMAL MG/DL
LEUKOCYTE ESTERASE UR QL STRIP: ABNORMAL
LYMPHOCYTES NFR BLD: 32.5 % (ref 19.3–53.1)
MCH RBC QN AUTO: 22.8 PG (ref 27–32)
MCHC RBC AUTO-ENTMCNC: 30.6 G/DL (ref 32–36)
MCV RBC AUTO: 74.7 FL (ref 82–100)
MONOCYTES NFR BLD: 9 % (ref 4.7–12.5)
MUCOUS THREADS URNS QL MICRO: ABNORMAL
NEUTROPHILS # BLD AUTO: 3.61 X 10 3/UL (ref 2.15–7.56)
NEUTROPHILS NFR BLD: 54.3 % (ref 34–71.1)
NITRITE UR QL STRIP: POSITIVE
NUCLEATED RED BLOOD CELLS: 0 /100 WBC (ref 0–0.2)
OCCULT BLOOD: NEGATIVE
PH, URINE: 5 (ref 5–7.5)
PLATELET # BLD AUTO: 250 X 10 3/UL (ref 135–400)
PROGEST SERPL-MCNC: 20.1 NG/ML
PROT UR QL STRIP: 25 MG/DL
RBC # BLD AUTO: 4.51 X 10 6/UL (ref 4.2–5.4)
RBC/HPF: ABNORMAL
RDW-SD: 44.1 FL (ref 37–54)
RH, WEAK D (DU): NEGATIVE
RUBELLA IGG SCREEN: NORMAL
SP GR UR STRIP: 1.02 (ref 1–1.03)
SYPHILIS TREPONEMAL ANTIBODY: NONREACTIVE
T4, FREE: 1.06 NG/DL (ref 0.93–1.7)
TSH SERPL DL<=0.005 MIU/L-ACNC: 3.71 UIU/ML (ref 0.27–4.2)
URINE CULTURE, ROUTINE: NORMAL
UROBILINOGEN, URINE: 1 E.U./DL (ref 0–1)
WBC # BLD: 6.65 X 10 3/UL (ref 4.3–10.8)
WBC/HPF: ABNORMAL

## 2024-03-18 DIAGNOSIS — Z34.90 PREGNANCY, UNSPECIFIED GESTATIONAL AGE: Primary | ICD-10-CM

## 2024-03-18 RX ORDER — PROGESTERONE 200 MG/1
200 CAPSULE ORAL DAILY
Qty: 30 CAPSULE | Refills: 2 | Status: SHIPPED | OUTPATIENT
Start: 2024-03-18

## 2024-03-18 NOTE — TELEPHONE ENCOUNTER
March 18, 2024  Me   to Mariam Schaefer   RS      3/18/24  5:45 PM  Good evening Mariam,      I spoke with Dr. Caballero regarding taking Progesterone. Your level is 20.1 which is within normal range.  He does not feel you need Progesterone but if it makes you feel better he will send out a prescription.  This Progesterone may cause increased nausea.  Please check with your Pharmacy tomorrow.      Katelynn     This Patient Portal message has not been read.           3/18/24  5:04 PM  Josr Freeman LPN routed this conversation to Me  Josr Freeman LPN   to Mariam Schaefer         3/18/24  4:59 PM  The different options are surfak stool softener, which can be used daily, or colace, doxidan, fleet enema, metamucil, milk of magnesia, citrucel, or fibercon. Whichever you find works best for you.     This Patient Portal message has not been read.           3/18/24  2:04 PM  Magan Gilmore routed this conversation to Josr Freeman LPN Lauren D Tadlock   to [facility] (supporting Josr Freeman LPN)   LT      3/18/24 12:59 PM  Also! What can I take to help me go to the bathroom? I noticed on this iron its been over a week..   March 15, 2024  Ronald Gonzalezel   to Josr Freeman LPN       3/15/24 12:41 PM  This message arrived in the [facility], and has been routed back to you as the original recipient of the message.  Mariam Schaefer   to [facility] (supporting Josr Freeman LPN)   LT      3/15/24 12:09 PM  Pinky Pace!      I was just following up after yesterday. Still concerned that my hcg didnt completely double, but my other question is should I be taking a progesterone supplement at all? My friend group said they all were put on in their 30s immediately so I got worried that maybe I should have I asked that yesterday? If it helps in anyway should I be taking it? Sorry to bother. Just didnt want to go into the weekend it out asking.

## 2024-03-19 DIAGNOSIS — O21.9 NAUSEA AND VOMITING DURING PREGNANCY: Primary | ICD-10-CM

## 2024-03-19 RX ORDER — PROMETHAZINE HYDROCHLORIDE 25 MG/1
25 TABLET ORAL EVERY 6 HOURS PRN
Qty: 18 TABLET | Refills: 1 | Status: SHIPPED | OUTPATIENT
Start: 2024-03-19

## 2024-03-19 NOTE — TELEPHONE ENCOUNTER
Mariam,      I am submitting a medication request to  to treat nausea.  Please check with your Pharmacy this afternoon.  If you are unable to hold fluids down, please go to ER for IV meds and hydration.     Katelynn AUSTIN      Yes! Please. Last night was awful. Sometimes it comes at night worse than during the day!           You  Mariam Schaefer1 hour ago (9:30 AM)     RS      Mariam,      Your Progesterone level is normal. Dr. Caballero does not feel you need to take Progesterone.  Would you like a prescription sent out for nausea?     Magan Buchanan routed conversation to You1 hour ago (9:23 AM)     Mariam Schaefer  [facility] (supporting You)1 hour ago (9:18 AM)     LT Pinky Pace! Im already so sick I can barely function during the day. What do you think is the right thing to do? If I dont need, I dont want to take it.           You  Mariam Schaefer16 hours ago (5:45 PM)     RS      Good evening Mariam,      I spoke with Dr. Caballero regarding taking Progesterone. Your level is 20.1 which is within normal range.  He does not feel you need Progesterone but if it makes you feel better he will send out a prescription.  This Progesterone may cause increased nausea.  Please check with your Pharmacy tomorrow.

## 2024-04-03 ENCOUNTER — PROCEDURE VISIT (OUTPATIENT)
Dept: OBSTETRICS AND GYNECOLOGY | Facility: CLINIC | Age: 39
End: 2024-04-03
Payer: COMMERCIAL

## 2024-04-03 DIAGNOSIS — Z3A.01 LESS THAN 8 WEEKS GESTATION OF PREGNANCY: ICD-10-CM

## 2024-04-03 PROCEDURE — 76801 OB US < 14 WKS SINGLE FETUS: CPT | Mod: S$GLB,,, | Performed by: OBSTETRICS & GYNECOLOGY

## 2024-04-09 ENCOUNTER — PATIENT MESSAGE (OUTPATIENT)
Dept: OBSTETRICS AND GYNECOLOGY | Facility: CLINIC | Age: 39
End: 2024-04-09
Payer: COMMERCIAL

## 2024-04-09 DIAGNOSIS — O21.9 EXCESSIVE VOMITING IN PREGNANCY: Primary | ICD-10-CM

## 2024-04-09 RX ORDER — ONDANSETRON HYDROCHLORIDE 8 MG/1
8 TABLET, FILM COATED ORAL EVERY 8 HOURS PRN
Qty: 30 TABLET | Refills: 1 | Status: SHIPPED | OUTPATIENT
Start: 2024-04-09

## 2024-04-09 NOTE — TELEPHONE ENCOUNTER
"Patient text Josr on her personal cell. She is reporting " I can barely function. I am so sick.  I know It is not advised to take Zofran prior to ten weeks gestation but I can not hold anything down.  I can barely get out of bed.  I am sick all day and all night.   I will reach out to patient to notify her will we send a prescription of Zofran to her pharmacy.  She does need to be reminded of the risk of Fetal Cardiac Abnormalities when taking Zofran in the first trimester.    "

## 2024-04-16 ENCOUNTER — CLINICAL SUPPORT (OUTPATIENT)
Dept: OBSTETRICS AND GYNECOLOGY | Facility: CLINIC | Age: 39
End: 2024-04-16
Payer: COMMERCIAL

## 2024-04-16 ENCOUNTER — INITIAL PRENATAL (OUTPATIENT)
Dept: OBSTETRICS AND GYNECOLOGY | Facility: CLINIC | Age: 39
End: 2024-04-16
Payer: COMMERCIAL

## 2024-04-16 VITALS — DIASTOLIC BLOOD PRESSURE: 83 MMHG | SYSTOLIC BLOOD PRESSURE: 114 MMHG | WEIGHT: 136.63 LBS | HEART RATE: 94 BPM

## 2024-04-16 DIAGNOSIS — Z34.90 PREGNANCY, UNSPECIFIED GESTATIONAL AGE: Primary | ICD-10-CM

## 2024-04-16 DIAGNOSIS — O09.521 AMA (ADVANCED MATERNAL AGE) MULTIGRAVIDA 35+, FIRST TRIMESTER: Primary | ICD-10-CM

## 2024-04-16 PROCEDURE — 0500F INITIAL PRENATAL CARE VISIT: CPT | Mod: CPTII,S$GLB,, | Performed by: OBSTETRICS & GYNECOLOGY

## 2024-04-16 NOTE — PROGRESS NOTES
CHIEF COMPLAINT:   Patient presents with      initial OB        HISTORY OF PRESENT ILLNESS  Mariam Schaefer 38 y.o.  presents for initial OB  The patient has no complaints today.  Nausea discussed and fluid intake including sprite, gator aid, cool aid,    No bleeding or pain.    Genetic testing is discussed in detail with her .   Denies chemical/pesticide/radiation exposure.    OB history:      LMP: Patient's last menstrual period was 2024 (exact date).  EDC: Estimated Date of Delivery: 24  EGA: 11w3d       Health Maintenance   Topic Date Due    Lipid Panel  Never done    TETANUS VACCINE  Never done    Hepatitis C Screening  Completed       Past Medical History:   Diagnosis Date    Anemia     Anxiety disorder, unspecified     Decreased libido     Diffuse cystic mastopathy     Dysmenorrhea     Hormone imbalance     Menorrhagia     PMS (premenstrual syndrome)     Psychosexual dysfunction        Past Surgical History:   Procedure Laterality Date    AUGMENTATION OF BREAST Bilateral      SECTION      2    WISDOM TOOTH EXTRACTION         Family History   Problem Relation Name Age of Onset    No Known Problems Paternal Grandfather      No Known Problems Paternal Grandmother      Pancreatic cancer Maternal Grandmother      No Known Problems Maternal Grandfather      Hypertension Father      Melanoma Mother      No Known Problems Brother      No Known Problems Sister      Breast cancer Neg Hx      Colon cancer Neg Hx      Diabetes Neg Hx      Eclampsia Neg Hx      Miscarriages / Stillbirths Neg Hx       labor Neg Hx      Uterine cancer Neg Hx      Cervical cancer Neg Hx      Stroke Neg Hx      Ovarian cancer Neg Hx         Social History     Socioeconomic History    Marital status:    Tobacco Use    Smoking status: Former     Types: Cigarettes    Smokeless tobacco: Never   Substance and Sexual Activity    Alcohol use: Not Currently     Comment: OCCASIONAL     Drug use: Never     Sexual activity: Yes     Partners: Male       Current Outpatient Medications   Medication Sig Dispense Refill    prenatal 25/iron/folate 6/dha (PRENA1 ORAL) Take by mouth.      ALPRAZolam (XANAX) 0.5 MG tablet Take 1 tablet (0.5 mg total) by mouth 3 (three) times daily as needed for Insomnia or Anxiety. (Patient not taking: Reported on 3/14/2024) 30 tablet 0    boric acid (PH-D) 600 mg vaginal suppository Place 1 suppository (600 mg total) vaginally every evening. As directed (Patient not taking: Reported on 3/14/2024) 30 suppository 3    ondansetron (ZOFRAN) 8 MG tablet Take 1 tablet (8 mg total) by mouth every 8 (eight) hours as needed for Nausea. 30 tablet 1    progesterone (PROMETRIUM) 200 MG capsule Take 1 capsule (200 mg total) by mouth once daily. (Patient not taking: Reported on 4/16/2024) 30 capsule 2    promethazine (PHENERGAN) 25 MG tablet Take 1 tablet (25 mg total) by mouth every 6 (six) hours as needed. 18 tablet 1    sertraline (ZOLOFT) 50 MG tablet Take one half pill for 6 days (Patient not taking: Reported on 3/14/2024) 30 tablet 2     No current facility-administered medications for this visit.       Review of patient's allergies indicates:   Allergen Reactions    Bactrim [sulfamethoxazole-trimethoprim]     Sulfa (sulfonamide antibiotics)          PHYSICAL EXAM   Vitals:    04/16/24 1027   BP: 114/83   Pulse: 94        PAIN SCALE: 0/10 None    PHYSICAL EXAM    ROS:  GENERAL: No fever, chills, fatigability.  CV: Denies chest pain  PULM: Denies shortness of breath or wheezing.  ABDOMEN denies  abdominal pain, hematemesis or blood in stool.  URINARY: No flank pain, dysuria or hematuria.  REPRODUCTIVE: No abnormal vaginal bleeding.       PE:   APPEARANCE: Well nourished, well developed, in no acute distress  CHEST: Clear to auscultation bilaterally  CV: Regular rate and rhythm  BREASTS: Symmetrical, no skin changes or visible lesions. No palpable masses, nipple discharge or adenopathy bilaterally. Mild  bilateral fibrocystic changes  ABDOMEN: Soft. No tenderness or masses. No hepatosplenomegaly. No hernias  PELVIC:   VULVA: No lesions. Normal female genitalia.  URETHRAL MEATUS: Normal size and location, no lesions, no prolapse.  URETHRA: No masses, tenderness, prolapse or scarring.  VAGINA: Moist and well rugated, no discharge, no significant cystocele or rectocele.  CERVIX: No lesions, normal diameter, no stenosis, no cervical motion tenderness.   UTERUS: size, regular shape, mobile, non-tender, normal position, good support.  ADNEXA: No masses, tenderness or CDS nodularity.  ANUS PERINEUM: No lesions, no relaxation, no external hemorrhoids.     UPT +    A/P:     -      Patient was counseled today on A.C.S. Pap guidelines and recommendations for yearly pelvic exams, mammograms and monthly self breast exams; to see her PCP for other health maintenance and pregnancy.   -      Patient's medications and medical history reviewed with patient and implications in pregnancy.   -      Pregnancy course discussed and 'AtoZ' book given. Patient was counseled on proper weight gain based on the [employer] [facility]'s recommendations based on her pre-pregnancy weight. Discussed foods to avoid in pregnancy (i.e. sushi, fish that are high in mercury, deli meat, and unpasteurized cheeses). Discussed prenatal vitamin options (i.e. stool softener, DHA). Discussed potential medical problems in pregnancy.  -     Discussed risk of Toxoplasmosis transmission from pets and reviewed risk reduction techniques.  -     Discused increased risks with AMA status.  She will consider  -     Chromosomal abnormality risk discussed and available testing   -     Pt was counseled on exercise in pregnancy and weight gain recommendations.  -     Pt was counseled on travel recommendations and on risks of Covid 19 virus exposure.    -     Covid prevention reviewed with patient  -     Oriented to practice including call coverage   -     Follow-up routine  OB  labs and u/s-- .   -       Pt is aware we call all results. If she does not hear from our office regarding her result within a week of having a study or procedure performed she is to call the office so that we can research the result for her as I do not know when she is scheduling her procedures.

## 2024-04-19 LAB
CHLAMYDIA: NEGATIVE
GONORRHEA: NEGATIVE
SOURCE: NORMAL

## 2024-05-02 ENCOUNTER — TELEPHONE (OUTPATIENT)
Dept: OBSTETRICS AND GYNECOLOGY | Facility: CLINIC | Age: 39
End: 2024-05-02
Payer: COMMERCIAL

## 2024-05-14 ENCOUNTER — ROUTINE PRENATAL (OUTPATIENT)
Dept: OBSTETRICS AND GYNECOLOGY | Facility: CLINIC | Age: 39
End: 2024-05-14
Payer: COMMERCIAL

## 2024-05-14 VITALS — WEIGHT: 138 LBS | DIASTOLIC BLOOD PRESSURE: 74 MMHG | HEART RATE: 85 BPM | SYSTOLIC BLOOD PRESSURE: 105 MMHG

## 2024-05-14 DIAGNOSIS — Z36.89 ENCOUNTER FOR FETAL ANATOMIC SURVEY: Primary | ICD-10-CM

## 2024-05-14 DIAGNOSIS — Z34.90 PREGNANCY, UNSPECIFIED GESTATIONAL AGE: Primary | ICD-10-CM

## 2024-05-14 PROCEDURE — 0502F SUBSEQUENT PRENATAL CARE: CPT | Mod: CPTII,S$GLB,, | Performed by: OBSTETRICS & GYNECOLOGY

## 2024-05-14 NOTE — PROGRESS NOTES
38 y.o. female  at 15w3d   feeling flutters/FM, denies VB, leaking of fluid or cramping  Doing well   TWG:-2   Reviewed prenatal lab  Anatomy US ordered  Reviewed warning signs, pregnancy precautions and how/when to call.  RTC x 4 wks, call or present sooner prn.   The tono is working she is doing ok with this

## 2024-05-17 ENCOUNTER — TELEPHONE (OUTPATIENT)
Dept: OBSTETRICS AND GYNECOLOGY | Facility: CLINIC | Age: 39
End: 2024-05-17
Payer: COMMERCIAL

## 2024-05-17 NOTE — TELEPHONE ENCOUNTER
Patient text Josr on her cell phone requesting results of a Urinalysis  I called patient and advised her to please reach us through her [facility]  She was seen on Tuesday  I do not see an abnormal urine noted in her chart.   Patient is asymptomatic

## 2024-05-18 ENCOUNTER — PATIENT MESSAGE (OUTPATIENT)
Dept: OTHER | Facility: OTHER | Age: 39
End: 2024-05-18
Payer: COMMERCIAL

## 2024-05-25 ENCOUNTER — PATIENT MESSAGE (OUTPATIENT)
Dept: OTHER | Facility: OTHER | Age: 39
End: 2024-05-25
Payer: COMMERCIAL

## 2024-06-11 ENCOUNTER — ROUTINE PRENATAL (OUTPATIENT)
Dept: OBSTETRICS AND GYNECOLOGY | Facility: CLINIC | Age: 39
End: 2024-06-11
Payer: COMMERCIAL

## 2024-06-11 ENCOUNTER — PROCEDURE VISIT (OUTPATIENT)
Dept: OBSTETRICS AND GYNECOLOGY | Facility: CLINIC | Age: 39
End: 2024-06-11
Payer: COMMERCIAL

## 2024-06-11 VITALS — WEIGHT: 142 LBS | DIASTOLIC BLOOD PRESSURE: 73 MMHG | HEART RATE: 84 BPM | SYSTOLIC BLOOD PRESSURE: 104 MMHG

## 2024-06-11 DIAGNOSIS — Z34.90 PREGNANCY, UNSPECIFIED GESTATIONAL AGE: Primary | ICD-10-CM

## 2024-06-11 DIAGNOSIS — Z36.89 ENCOUNTER FOR FETAL ANATOMIC SURVEY: ICD-10-CM

## 2024-06-11 PROCEDURE — 0503F POSTPARTUM CARE VISIT: CPT | Mod: CPTII,S$GLB,, | Performed by: OBSTETRICS & GYNECOLOGY

## 2024-06-11 PROCEDURE — 76805 OB US >/= 14 WKS SNGL FETUS: CPT | Mod: S$GLB,,, | Performed by: OBSTETRICS & GYNECOLOGY

## 2024-06-11 NOTE — PROGRESS NOTES
38 y.o. female  at 19w3d   Starting to feel flutters/FM, denies VB, leaking of fluid, cramping  Doing well   TW  anatomy US  Reviewed warning signs, normal FM,  labor precautions and how/when to call.  RTC x 4 wks, call or present sooner prn.   Drink plenty of fluids

## 2024-06-14 ENCOUNTER — PATIENT MESSAGE (OUTPATIENT)
Dept: OBSTETRICS AND GYNECOLOGY | Facility: CLINIC | Age: 39
End: 2024-06-14
Payer: COMMERCIAL

## 2024-06-14 ENCOUNTER — E-VISIT (OUTPATIENT)
Dept: FAMILY MEDICINE | Facility: CLINIC | Age: 39
End: 2024-06-14
Payer: COMMERCIAL

## 2024-06-14 DIAGNOSIS — N39.0 URINARY TRACT INFECTION WITHOUT HEMATURIA, SITE UNSPECIFIED: Primary | ICD-10-CM

## 2024-06-14 DIAGNOSIS — O23.42 UTI IN PREGNANCY, ANTEPARTUM, SECOND TRIMESTER: Primary | ICD-10-CM

## 2024-06-14 RX ORDER — AMOXICILLIN 500 MG/1
500 TABLET, FILM COATED ORAL EVERY 8 HOURS
Qty: 15 TABLET | Refills: 0 | Status: SHIPPED | OUTPATIENT
Start: 2024-06-14 | End: 2024-06-14

## 2024-06-14 RX ORDER — CEPHALEXIN 500 MG/1
500 CAPSULE ORAL 3 TIMES DAILY
Qty: 21 CAPSULE | Refills: 0 | Status: SHIPPED | OUTPATIENT
Start: 2024-06-14 | End: 2024-06-21

## 2024-06-14 NOTE — PROGRESS NOTES
Patient ID: Mariam Schaefer is a 38 y.o. female.    Chief Complaint: Urinary Tract Infection (Entered automatically based on patient selection in Patient Portal.)    The patient initiated a request through [facility] on 6/14/2024 for evaluation and management with a chief complaint of Urinary Tract Infection (Entered automatically based on patient selection in Patient Portal.)     I evaluated the questionnaire submission on 6/14/24.    [facility] Peq Evisit Uti Questionnaire    6/14/2024 11:52 AM CDT - Filed by Patient   Do you agree to participate in an E-Visit? Yes   If you have any of the following symptoms, please present to your local emergency room or call 911:  I acknowledge   Are you pregnant, could you be pregnant, or are you breast feeding? Pregnant   What is the main issue you would like addressed today? UTI   What symptoms do you currently have? Pain while passing urine;  Difficulty passing urine;  Change in urine appearance or smell   When did your symptoms first appear? 6/14/2024   List what you have done or taken to help your symptoms. NA   Please indicate whether you have had the following symptoms during the past 24 hours     Urgent urination (a sudden and uncontrollable urge to urinate) Moderate   Frequent urination of small amounts of urine (going to the toilet very often) Moderate   Burning pain when urinating Moderate   Incomplete bladder emptying (still feel like you need to urinate again after urination) Moderate   Pain not associated with urination in the lower abdomen below the belly button) Mild   What does your urine look like? I am not sure   Blood seen in the urine None   Flank pain (pain in one or both sides of the lower back) Moderate   Abnormal Vaginal Discharge (abnormal amount, color and/or odor) None   Discharge from the urethra (urinary opening) without urination None   High body temperature/fever? None-<99.5   Please rate how much discomfort you have experience because of the symptoms in  the past 24 hours: Mild   Please indicate how the symptoms have interfered with your every day activities/work in the past 24 hours: Mild   Please indicate how these symptoms have interfered with your social activities (visiting people, meeting with friends, etc.) in the past 24 hours? Mild   Are you a diabetic? No   Please indicate whether you have the following at the time of completion of this questionnaire: Pregnancy   Provide any additional information you feel is important.    Please attach any relevant images or files (if you have performed a home test for UTI, please submit a photo of results)    Are you able to take your vital signs? No         Encounter Diagnosis   Name Primary?    Urinary tract infection without hematuria, site unspecified Yes        No orders of the defined types were placed in this encounter.           No follow-ups on file.      E-Visit Time Tracking: 15 mins

## 2024-06-14 NOTE — TELEPHONE ENCOUNTER
I am sending an order for a Urinalysis and culture and Sensitivity  to [facility].  Please give a sample today.  I am also submitting a medication request to Dr. Caballero for an antibiotic to treat your symptoms. You may start taking the antibiotic after giving a urine sample.  Increase your fluids, do not hold your urine, void frequently, wipe from front to back and take the entire antibiotic  Please check with [facility] later today.    Katelynn   ===View-only below this line===      ----- Message -----       From:Mariam Schaefer       Sent:6/14/2024 11:58 AM CDT         To:Harshil Caballero III    Subject:UTI    Im pretty sure that I have something going on. May to run by [facility] to get a urine check! If you guys can call me and let me know if thats something I can do today, that would be great! Thank you!

## 2024-07-11 ENCOUNTER — ROUTINE PRENATAL (OUTPATIENT)
Dept: OBSTETRICS AND GYNECOLOGY | Facility: CLINIC | Age: 39
End: 2024-07-11
Payer: COMMERCIAL

## 2024-07-11 VITALS — HEART RATE: 98 BPM | WEIGHT: 148 LBS | DIASTOLIC BLOOD PRESSURE: 80 MMHG | SYSTOLIC BLOOD PRESSURE: 114 MMHG

## 2024-07-11 DIAGNOSIS — Z34.90 PREGNANCY, UNSPECIFIED GESTATIONAL AGE: Primary | ICD-10-CM

## 2024-07-11 DIAGNOSIS — Z3A.23 23 WEEKS GESTATION OF PREGNANCY: ICD-10-CM

## 2024-07-11 LAB
BILIRUB SERPL-MCNC: NORMAL MG/DL
BLOOD URINE, POC: NORMAL
CLARITY, POC UA: CLEAR
COLOR, POC UA: YELLOW
GLUCOSE UR QL STRIP: NORMAL
KETONES UR QL STRIP: NORMAL
LEUKOCYTE ESTERASE URINE, POC: NORMAL
NITRITE, POC UA: NORMAL
PH, POC UA: 6.5
PROTEIN, POC: NORMAL
SPECIFIC GRAVITY, POC UA: 1.03
UROBILINOGEN, POC UA: 1

## 2024-07-11 PROCEDURE — 0502F SUBSEQUENT PRENATAL CARE: CPT | Mod: CPTII,S$GLB,, | Performed by: OBSTETRICS & GYNECOLOGY

## 2024-07-11 RX ORDER — HUMAN RHO(D) IMMUNE GLOBULIN 300 UG/1
300 INJECTION, SOLUTION INTRAMUSCULAR ONCE
Qty: 300 MCG | Refills: 0 | Status: SHIPPED | OUTPATIENT
Start: 2024-07-11 | End: 2024-07-11

## 2024-07-11 NOTE — PROGRESS NOTES
38 y.o. female  at 23w5d   Reports + FM, denies VB, leaking of fluid, or cramping  Doing well   TW  Reviewed upcoming 28wk labs, and orders placed, tdap handout provided and explained  Reviewed warning signs, normal FM,  labor precautions and how/when to call.  RTC x 4 wks, call or present sooner prn.

## 2024-07-13 ENCOUNTER — PATIENT MESSAGE (OUTPATIENT)
Dept: OTHER | Facility: OTHER | Age: 39
End: 2024-07-13
Payer: COMMERCIAL

## 2024-07-23 ENCOUNTER — PATIENT MESSAGE (OUTPATIENT)
Dept: OBSTETRICS AND GYNECOLOGY | Facility: CLINIC | Age: 39
End: 2024-07-23
Payer: COMMERCIAL

## 2024-07-23 DIAGNOSIS — B37.31 YEAST INFECTION OF THE VAGINA: Primary | ICD-10-CM

## 2024-07-23 RX ORDER — TERCONAZOLE 4 MG/G
1 CREAM VAGINAL NIGHTLY
Qty: 7 EACH | Refills: 0 | Status: SHIPPED | OUTPATIENT
Start: 2024-07-23 | End: 2024-07-30

## 2024-07-27 ENCOUNTER — PATIENT MESSAGE (OUTPATIENT)
Dept: OTHER | Facility: OTHER | Age: 39
End: 2024-07-27
Payer: COMMERCIAL

## 2024-08-05 ENCOUNTER — PATIENT MESSAGE (OUTPATIENT)
Dept: OBSTETRICS AND GYNECOLOGY | Facility: CLINIC | Age: 39
End: 2024-08-05
Payer: COMMERCIAL

## 2024-08-05 DIAGNOSIS — O99.012 ANEMIA DURING PREGNANCY IN SECOND TRIMESTER: ICD-10-CM

## 2024-08-05 DIAGNOSIS — O23.42 UTI IN PREGNANCY, ANTEPARTUM, SECOND TRIMESTER: ICD-10-CM

## 2024-08-05 DIAGNOSIS — B37.9 YEAST INFECTION: Primary | ICD-10-CM

## 2024-08-05 DIAGNOSIS — R00.0 TACHYCARDIA: ICD-10-CM

## 2024-08-05 RX ORDER — TERCONAZOLE 4 MG/G
1 CREAM VAGINAL NIGHTLY
Qty: 7 G | Refills: 0 | Status: SHIPPED | OUTPATIENT
Start: 2024-08-05

## 2024-08-06 DIAGNOSIS — R73.09 ELEVATED GLUCOSE TOLERANCE TEST: Primary | ICD-10-CM

## 2024-08-06 DIAGNOSIS — O23.42 UTI IN PREGNANCY, ANTEPARTUM, SECOND TRIMESTER: ICD-10-CM

## 2024-08-06 DIAGNOSIS — B37.9 YEAST INFECTION: ICD-10-CM

## 2024-08-06 DIAGNOSIS — O99.012 ANEMIA DURING PREGNANCY IN SECOND TRIMESTER: ICD-10-CM

## 2024-08-06 RX ORDER — NITROFURANTOIN 25; 75 MG/1; MG/1
100 CAPSULE ORAL 2 TIMES DAILY
Qty: 14 CAPSULE | Refills: 0 | Status: SHIPPED | OUTPATIENT
Start: 2024-08-06 | End: 2024-08-13

## 2024-08-10 ENCOUNTER — PATIENT MESSAGE (OUTPATIENT)
Dept: OTHER | Facility: OTHER | Age: 39
End: 2024-08-10
Payer: COMMERCIAL

## 2024-08-12 ENCOUNTER — ROUTINE PRENATAL (OUTPATIENT)
Dept: OBSTETRICS AND GYNECOLOGY | Facility: CLINIC | Age: 39
End: 2024-08-12
Payer: COMMERCIAL

## 2024-08-12 VITALS — SYSTOLIC BLOOD PRESSURE: 115 MMHG | WEIGHT: 151 LBS | HEART RATE: 75 BPM | DIASTOLIC BLOOD PRESSURE: 78 MMHG

## 2024-08-12 DIAGNOSIS — Z34.90 PREGNANCY, UNSPECIFIED GESTATIONAL AGE: Primary | ICD-10-CM

## 2024-08-12 RX ORDER — NAPROXEN SODIUM 220 MG/1
81 TABLET, FILM COATED ORAL DAILY
COMMUNITY

## 2024-08-12 NOTE — PROGRESS NOTES
38 y.o. female  at 28w2d   Reports + FM, denies VB, Leaking fluid  or CTX  Doing well   TW  28wk labs today   Rhogam - Needed and discussed  three hr and rhogam tomorrow  Tdap discussed  Reviewed warning signs, normal fetal mvts discussed  ,  labor precautions and how/when to call.  RTC x 2 wks, call or present sooner prn.

## 2024-08-13 LAB
ABO + RH BLD: NORMAL
INDIRECT COOMBS: NEGATIVE
RH IMMUNE GLOBULIN: NORMAL

## 2024-08-14 ENCOUNTER — TELEPHONE (OUTPATIENT)
Dept: OBSTETRICS AND GYNECOLOGY | Facility: CLINIC | Age: 39
End: 2024-08-14
Payer: COMMERCIAL

## 2024-08-14 DIAGNOSIS — O24.410 DIET CONTROLLED GESTATIONAL DIABETES MELLITUS (GDM) IN THIRD TRIMESTER: Primary | ICD-10-CM

## 2024-08-14 NOTE — TELEPHONE ENCOUNTER
Three hour Glucose study is elevated   Patient is diagnosed with Gestational Diabetes Results discussed with patient  I am ordering a referral to Diabetes Education. You will be instructed on a DM diet, Glucose Monitoring, Recording a Glucose Log and sending me your readings weekly.  Call your insurance company to find out what Glucometer they cover so I may send out a script for the devise and test strips and lancets    HGB and HCT remain low post Infusion.  Repeat CBC on Monday if remains low will order an additional Iron Infusion.  Patient acknowledged with verbal understanding

## 2024-08-19 DIAGNOSIS — R73.09 ELEVATED GLUCOSE TOLERANCE TEST: Primary | ICD-10-CM

## 2024-08-19 NOTE — PROGRESS NOTES
ll order the repeat Fasting Three Hour Glucose Tolerance test.  Please fast from midnight through the end of the study.  Illness can affect your Glucose.      Katelynn     This MyChart message has not been read.     Mariam LUGO [facility] G2 Clinical Support Staff (supporting You)2 minutes ago (10:12 AM)       I hate to waste time and do it all over again, but its not fun, BUT Im  Willing to if you feel any of my issue play a factor in the levels. What do you think? If you dont think any of it had anything to do with it, Ill just move on and go with my glucometer to the meeting tomorrow. Just didnt want to do anything unnecessary.        You  Mariam Schaefer5 minutes ago (10:10 AM)     SALBADOR Becerra,      If you want to recheck a Three Hour Glucose Tolerance Test I am happy to order for you.     Katelynn LUGO [facility] G2 Clinical Support Staff (supporting You)15 minutes ago (10:00 AM)       Im going to do my CBC in the morning and then Ill head to do the iron infusion again. The diabetic lady said theyll come to me at the infusion to do the counseling on the gestational diabetes. The iron infusion IS HELPING. My heartrate didnt rise yesterday over 140. First day in a month. And I ate 2 full meals. Just wanted to tell you!!       Mariam LUGO [facility] G2 Clinical Support Staff (supporting You)15 minutes ago (10:00 AM)       Youre the professionals and know more, but I just thought maybe since I was in a bad place With all that it might change the results. If not, Ill just continue with my appt tomorrow with the diabetic people at the hospital. Whatever you think.        Mariam LUGO [facility] G2 Clinical Support Staff (supporting You)16 minutes ago (9:59 AM)       Curious. Before I do all this for the diabetes stuff, should I take the test again maybe tomorrow? Im asking bc last  week (and the weeks before) was probably the worst weeks of my life I had a lot going on UTI, yeast infection, iron issue, etc. I wasnt able to eat at all and I have felt much better this weekend (not amazing but not pass out cant get up type bad) so I was thinking maybe since I was able to eat well balanced meals the last 3 days I would be able to pass it and avoid something that may be unnecessary.

## 2024-08-20 DIAGNOSIS — O99.012 ANEMIA DURING PREGNANCY IN SECOND TRIMESTER: Primary | ICD-10-CM

## 2024-08-20 LAB
ABS NRBC COUNT: 0 X 10 3/UL (ref 0–0.01)
ABSOLUTE BASOPHIL: 0.05 X 10 3/UL (ref 0–0.22)
ABSOLUTE EOSINOPHIL: 0.18 X 10 3/UL (ref 0.04–0.54)
ABSOLUTE IMMATURE GRAN: 0.07 X 10 3/UL (ref 0–0.04)
ABSOLUTE LYMPHOCYTE: 2.02 X 10 3/UL (ref 0.86–4.75)
ABSOLUTE MONOCYTE: 0.64 X 10 3/UL (ref 0.22–1.08)
BASOPHILS NFR BLD: 0.5 % (ref 0.2–1.2)
EOSINOPHIL NFR BLD: 1.8 % (ref 0.7–7)
HCT VFR BLD AUTO: 32.2 % (ref 37–47)
HGB BLD-MCNC: 9.9 G/DL (ref 12–16)
IMMATURE GRANULOCYTES: 0.7 % (ref 0–0.5)
LYMPHOCYTES NFR BLD: 20.7 % (ref 19.3–53.1)
MCH RBC QN AUTO: 24.8 PG (ref 27–32)
MCHC RBC AUTO-ENTMCNC: 30.7 G/DL (ref 32–36)
MCV RBC AUTO: 80.7 FL (ref 82–100)
MONOCYTES NFR BLD: 6.6 % (ref 4.7–12.5)
NEUTROPHILS # BLD AUTO: 6.81 X 10 3/UL (ref 2.15–7.56)
NEUTROPHILS NFR BLD: 69.7 % (ref 34–71.1)
NUCLEATED RED BLOOD CELLS: 0 /100 WBC (ref 0–0.2)
PLATELET # BLD AUTO: 203 X 10 3/UL (ref 135–400)
RBC # BLD AUTO: 3.99 X 10 6/UL (ref 4.2–5.4)
RDW-SD: 57.5 FL (ref 37–54)
WBC # BLD: 9.77 X 10 3/UL (ref 4.3–10.8)

## 2024-08-24 ENCOUNTER — PATIENT MESSAGE (OUTPATIENT)
Dept: OTHER | Facility: OTHER | Age: 39
End: 2024-08-24
Payer: COMMERCIAL

## 2024-08-27 ENCOUNTER — ROUTINE PRENATAL (OUTPATIENT)
Dept: OBSTETRICS AND GYNECOLOGY | Facility: CLINIC | Age: 39
End: 2024-08-27
Payer: COMMERCIAL

## 2024-08-27 VITALS — HEART RATE: 83 BPM | WEIGHT: 156 LBS | DIASTOLIC BLOOD PRESSURE: 75 MMHG | SYSTOLIC BLOOD PRESSURE: 112 MMHG

## 2024-08-27 DIAGNOSIS — Z34.90 PREGNANCY, UNSPECIFIED GESTATIONAL AGE: ICD-10-CM

## 2024-08-27 DIAGNOSIS — O24.410 DIET CONTROLLED GESTATIONAL DIABETES MELLITUS (GDM) IN THIRD TRIMESTER: Primary | ICD-10-CM

## 2024-08-27 DIAGNOSIS — O99.012 ANEMIA DURING PREGNANCY IN SECOND TRIMESTER: Primary | ICD-10-CM

## 2024-08-27 NOTE — PROGRESS NOTES
38 y.o. female  at 31w0d   Reports normal fetal mvts, denies vaginal bleeding, Leaking fluid  or regular contractions  Doing well  TW  28wk labs discussed (ON)  Tdap  Consents for  and Csec and blood transfusion risk discussed by me.  R&B discussed in detail and consents are signed   Reviewed warning signs, normal fetal movements,  labor precautions in detail   RTC x 2 wks, call or present sooner prn.

## 2024-09-03 ENCOUNTER — PATIENT MESSAGE (OUTPATIENT)
Dept: OTHER | Facility: OTHER | Age: 39
End: 2024-09-03
Payer: COMMERCIAL

## 2024-09-05 ENCOUNTER — ROUTINE PRENATAL (OUTPATIENT)
Dept: OBSTETRICS AND GYNECOLOGY | Facility: CLINIC | Age: 39
End: 2024-09-05
Payer: COMMERCIAL

## 2024-09-05 ENCOUNTER — PROCEDURE VISIT (OUTPATIENT)
Dept: OBSTETRICS AND GYNECOLOGY | Facility: CLINIC | Age: 39
End: 2024-09-05
Payer: COMMERCIAL

## 2024-09-05 ENCOUNTER — PATIENT MESSAGE (OUTPATIENT)
Dept: OBSTETRICS AND GYNECOLOGY | Facility: CLINIC | Age: 39
End: 2024-09-05

## 2024-09-05 VITALS — HEART RATE: 80 BPM | SYSTOLIC BLOOD PRESSURE: 124 MMHG | WEIGHT: 155 LBS | DIASTOLIC BLOOD PRESSURE: 83 MMHG

## 2024-09-05 DIAGNOSIS — Z34.90 PREGNANCY, UNSPECIFIED GESTATIONAL AGE: Primary | ICD-10-CM

## 2024-09-05 DIAGNOSIS — O24.410 DIET CONTROLLED GESTATIONAL DIABETES MELLITUS (GDM) IN THIRD TRIMESTER: ICD-10-CM

## 2024-09-05 NOTE — PROGRESS NOTES
38 y.o. female  at 32w2d   Reports normal fetal mvts, denies vaginal bleeding, Leaking fluid  or regular contractions  Doing well  TWG: 15  Tdap and covid vaccines discussed   Reviewed warning signs, normal fetal movements,  labor precautions discussed in detail   RTC x 2 wks, call or present sooner prn.

## 2024-09-06 ENCOUNTER — TELEPHONE (OUTPATIENT)
Dept: OBSTETRICS AND GYNECOLOGY | Facility: CLINIC | Age: 39
End: 2024-09-06
Payer: COMMERCIAL

## 2024-09-06 NOTE — TELEPHONE ENCOUNTER
I spoke with Mariam.   After speaking with Dr Montemayor he would like to see her.   His office will make the appointment

## 2024-09-10 LAB — A1 MICROGLOB PLACENTAL VAG QL: NORMAL

## 2024-09-11 ENCOUNTER — PROCEDURE VISIT (OUTPATIENT)
Dept: OBSTETRICS AND GYNECOLOGY | Facility: CLINIC | Age: 39
End: 2024-09-11
Payer: COMMERCIAL

## 2024-09-11 DIAGNOSIS — O45.93 PLACENTAL ABRUPTION IN THIRD TRIMESTER: Primary | ICD-10-CM

## 2024-09-11 DIAGNOSIS — Z34.90 PREGNANCY, UNSPECIFIED GESTATIONAL AGE: ICD-10-CM

## 2024-09-11 DIAGNOSIS — O42.90 AMNIOTIC FLUID LEAKING: Primary | ICD-10-CM

## 2024-09-17 ENCOUNTER — ROUTINE PRENATAL (OUTPATIENT)
Dept: OBSTETRICS AND GYNECOLOGY | Facility: CLINIC | Age: 39
End: 2024-09-17
Payer: COMMERCIAL

## 2024-09-17 VITALS — HEART RATE: 97 BPM | DIASTOLIC BLOOD PRESSURE: 80 MMHG | WEIGHT: 153 LBS | SYSTOLIC BLOOD PRESSURE: 122 MMHG

## 2024-09-17 DIAGNOSIS — N92.0 MENORRHAGIA WITH REGULAR CYCLE: Primary | ICD-10-CM

## 2024-09-17 DIAGNOSIS — N94.6 DYSMENORRHEA: ICD-10-CM

## 2024-09-17 NOTE — PROGRESS NOTES
38 y.o. female  at 34w0d   Reports normal fetal mvts, denies vaginal bleeding, Leaking fluid  or regular contractions  Doing well  TW   (ON)  Tdap covid vaccines discussed     Reviewed warning signs, normal fetal movements,  labor precautions in detail   RTC x 2 wks, call or present sooner prn.    She is doing well but she has not see Dr olmstead yet

## 2024-09-24 ENCOUNTER — PATIENT MESSAGE (OUTPATIENT)
Dept: OTHER | Facility: OTHER | Age: 39
End: 2024-09-24
Payer: COMMERCIAL

## 2024-10-01 ENCOUNTER — ROUTINE PRENATAL (OUTPATIENT)
Dept: OBSTETRICS AND GYNECOLOGY | Facility: CLINIC | Age: 39
End: 2024-10-01
Payer: COMMERCIAL

## 2024-10-01 VITALS — HEART RATE: 84 BPM | SYSTOLIC BLOOD PRESSURE: 118 MMHG | WEIGHT: 157.19 LBS | DIASTOLIC BLOOD PRESSURE: 81 MMHG

## 2024-10-01 DIAGNOSIS — Z34.03 ENCOUNTER FOR SUPERVISION OF NORMAL FIRST PREGNANCY IN THIRD TRIMESTER: Primary | ICD-10-CM

## 2024-10-01 DIAGNOSIS — Z34.90 PREGNANCY, UNSPECIFIED GESTATIONAL AGE: ICD-10-CM

## 2024-10-01 PROCEDURE — 0502F SUBSEQUENT PRENATAL CARE: CPT | Mod: CPTII,,, | Performed by: OBSTETRICS & GYNECOLOGY

## 2024-10-01 NOTE — PROGRESS NOTES
39 y.o. female  at 36w0d  Reports + FM, denies VB, Leaking or regular CTX  Doing well   TW  GBS collected    Reviewed warning signs, normal fetal movements, labor precautions discussed in detail   RTC 1 wks, or sooner prn. Labor and delivery PRN   Chaperone was present for pelvic exam     She is seeing the cardiologist on Thursday.    Fetal tachycardia

## 2024-10-02 LAB
GROUP B STREP MOLECULAR: NEGATIVE
PENICILLIN ALLERGIC: NO

## 2024-10-08 ENCOUNTER — ROUTINE PRENATAL (OUTPATIENT)
Dept: OBSTETRICS AND GYNECOLOGY | Facility: CLINIC | Age: 39
End: 2024-10-08
Payer: COMMERCIAL

## 2024-10-08 VITALS — WEIGHT: 161 LBS | SYSTOLIC BLOOD PRESSURE: 111 MMHG | DIASTOLIC BLOOD PRESSURE: 77 MMHG | HEART RATE: 90 BPM

## 2024-10-08 DIAGNOSIS — Z34.90 PREGNANCY, UNSPECIFIED GESTATIONAL AGE: Primary | ICD-10-CM

## 2024-10-08 PROCEDURE — 0502F SUBSEQUENT PRENATAL CARE: CPT | Mod: CPTII,,, | Performed by: OBSTETRICS & GYNECOLOGY

## 2024-10-08 NOTE — PROGRESS NOTES
39 y.o. female  at 37w0d  Reports + FM, denies VB, Leaking or regular CTX  Doing well without   TW  GBS collected  neg  Reviewed warning signs, normal fetal movements, labor precautions discussed in detail   RTC 1 wks, or sooner prn. Labor and delivery PRN   Chaperone was present for pelvic exam     Doing well  her cx is closed   She saw dr Heredia and will follow up after delivery

## 2024-10-15 ENCOUNTER — OUTSIDE PLACE OF SERVICE (OUTPATIENT)
Dept: OBSTETRICS AND GYNECOLOGY | Facility: CLINIC | Age: 39
End: 2024-10-15

## 2024-10-15 ENCOUNTER — ROUTINE PRENATAL (OUTPATIENT)
Dept: OBSTETRICS AND GYNECOLOGY | Facility: CLINIC | Age: 39
End: 2024-10-15
Payer: COMMERCIAL

## 2024-10-15 ENCOUNTER — PROCEDURE VISIT (OUTPATIENT)
Dept: OBSTETRICS AND GYNECOLOGY | Facility: CLINIC | Age: 39
End: 2024-10-15
Payer: COMMERCIAL

## 2024-10-15 VITALS — DIASTOLIC BLOOD PRESSURE: 80 MMHG | WEIGHT: 164.63 LBS | HEART RATE: 107 BPM | SYSTOLIC BLOOD PRESSURE: 128 MMHG

## 2024-10-15 DIAGNOSIS — Z87.898 HISTORY OF POOR FETAL GROWTH: ICD-10-CM

## 2024-10-15 DIAGNOSIS — Z87.898 HISTORY OF POOR FETAL GROWTH: Primary | ICD-10-CM

## 2024-10-15 DIAGNOSIS — Z34.90 PREGNANCY, UNSPECIFIED GESTATIONAL AGE: ICD-10-CM

## 2024-10-15 PROCEDURE — 58611 LIGATE OVIDUCT(S) ADD-ON: CPT | Mod: ,,, | Performed by: OBSTETRICS & GYNECOLOGY

## 2024-10-15 PROCEDURE — 59510 CESAREAN DELIVERY: CPT | Mod: AT,,, | Performed by: OBSTETRICS & GYNECOLOGY

## 2024-10-15 PROCEDURE — 76816 OB US FOLLOW-UP PER FETUS: CPT | Mod: 26,S$PBB,, | Performed by: OBSTETRICS & GYNECOLOGY

## 2024-10-15 PROCEDURE — 76819 FETAL BIOPHYS PROFIL W/O NST: CPT | Mod: 26,S$PBB,, | Performed by: OBSTETRICS & GYNECOLOGY

## 2024-10-15 NOTE — PROGRESS NOTES
39 y.o. female  at 38w0d  Reports + FM, denies VB, Leaking or regular CTX  Doing well without   TW  GBS collected    Reviewed warning signs, normal fetal movements, labor precautions discussed in detail   RTC 1 wks, or sooner prn. Labor and delivery PRN   Chaperone was present for pelvic exam     She went to a moment in time and the baby did not grow.     Growth scan  Cx LTC

## 2024-10-15 NOTE — PROGRESS NOTES
CM met with Pt frequently throughout the day, as he continues ask when he will leave  Pt is redirectable & pleasant  The baby has what appears to be episodes of SVT   I spoke with Dr. Martinez and he suggest it will be easier to treat the baby once delivered and that meds are not suggested at this time of the pregnancy.     Will proceed with a RLTCs

## 2024-10-21 ENCOUNTER — PATIENT MESSAGE (OUTPATIENT)
Dept: OBSTETRICS AND GYNECOLOGY | Facility: CLINIC | Age: 39
End: 2024-10-21
Payer: COMMERCIAL

## 2024-10-24 ENCOUNTER — POSTPARTUM VISIT (OUTPATIENT)
Dept: OBSTETRICS AND GYNECOLOGY | Facility: CLINIC | Age: 39
End: 2024-10-24
Payer: COMMERCIAL

## 2024-10-24 VITALS
HEIGHT: 65 IN | BODY MASS INDEX: 24.43 KG/M2 | HEART RATE: 103 BPM | WEIGHT: 146.63 LBS | DIASTOLIC BLOOD PRESSURE: 89 MMHG | SYSTOLIC BLOOD PRESSURE: 125 MMHG

## 2024-10-24 PROCEDURE — 99024 POSTOP FOLLOW-UP VISIT: CPT | Mod: ,,, | Performed by: OBSTETRICS & GYNECOLOGY

## 2024-10-24 NOTE — PROGRESS NOTES
Subjective:       Patient ID: Mariam Schaefer is a 39 y.o. female.    Chief Complaint:  Well Woman      History of Present Illness  She is doing well. No abnormal bleeding, No GI or Gu concerns,    No fever  post partum blues/anxiety discussed  She is doing very well and no concerns   the circ did very well       GYN & OB History  No LMP recorded.     Date of Last Pap: 2024    OB History    Para Term  AB Living   3 2 2     2   SAB IAB Ectopic Multiple Live Births           2      # Outcome Date GA Lbr Emmett/2nd Weight Sex Type Anes PTL Lv   3             2 Term 05/18/10 38w3d  3.317 kg (7 lb 5 oz) F CS-Unspec Spinal N FAMILIA   1 Term 07 40w0d  3.629 kg (8 lb) F CS-Unspec Spinal N FAMILIA      Complications: Failure to Progress in First Stage       Review of Systems  Review of Systems          Objective:    Physical Exam    Her incision looks very good.  Healing nicely  no evidence of infection    No sig abdominal tenderness        Assessment:        1. 2 weeks postpartum follow-up                 Plan:             She is to return for any reason.     I would like her to follow up in 4 weeks or sooner if needed   pelvic rest  Precautions discussed.   I am ok with driving as long as she is off pain meds       Fever or foul D/C needs to be evaluated.

## 2024-11-08 ENCOUNTER — CLINICAL SUPPORT (OUTPATIENT)
Dept: OBSTETRICS AND GYNECOLOGY | Facility: CLINIC | Age: 39
End: 2024-11-08
Payer: COMMERCIAL

## 2024-11-08 NOTE — PROGRESS NOTES
Jitendran is in to discuss extended Maternity Leave.  [employer] short term disability forms filled out  Patient and her baby are doing well without any concerns

## 2024-11-25 ENCOUNTER — PATIENT MESSAGE (OUTPATIENT)
Dept: OBSTETRICS AND GYNECOLOGY | Facility: CLINIC | Age: 39
End: 2024-11-25
Payer: COMMERCIAL

## 2024-11-26 ENCOUNTER — OFFICE VISIT (OUTPATIENT)
Dept: OBSTETRICS AND GYNECOLOGY | Facility: CLINIC | Age: 39
End: 2024-11-26
Payer: COMMERCIAL

## 2024-11-26 VITALS
BODY MASS INDEX: 24.06 KG/M2 | WEIGHT: 144.63 LBS | HEART RATE: 83 BPM | DIASTOLIC BLOOD PRESSURE: 67 MMHG | SYSTOLIC BLOOD PRESSURE: 117 MMHG

## 2024-11-26 PROCEDURE — 3074F SYST BP LT 130 MM HG: CPT | Mod: CPTII,,, | Performed by: OBSTETRICS & GYNECOLOGY

## 2024-11-26 PROCEDURE — 0503F POSTPARTUM CARE VISIT: CPT | Mod: CPTII,,, | Performed by: OBSTETRICS & GYNECOLOGY

## 2024-11-26 PROCEDURE — 1159F MED LIST DOCD IN RCRD: CPT | Mod: CPTII,,, | Performed by: OBSTETRICS & GYNECOLOGY

## 2024-11-26 PROCEDURE — 3078F DIAST BP <80 MM HG: CPT | Mod: CPTII,,, | Performed by: OBSTETRICS & GYNECOLOGY

## 2024-11-26 NOTE — PROGRESS NOTES
Subjective:       Patient ID: Mariam Schaefer is a 39 y.o. female.    Chief Complaint:  Postpartum Care (6 weeks post c section. Patient c/o having bleeding. )      History of Present Illness  She is doing well. No abnormal bleeding, No GI or Gu concerns,    No other concerns  Post partum blues/anxiety discussed      GYN & OB History  No LMP recorded.     Date of Last Pap: 2024    OB History    Para Term  AB Living   3 2 2     2   SAB IAB Ectopic Multiple Live Births           2      # Outcome Date GA Lbr Emmett/2nd Weight Sex Type Anes PTL Lv   3             2 Term 05/18/10 38w3d  3.317 kg (7 lb 5 oz) F CS-Unspec Spinal N FAMILIA   1 Term 07 40w0d  3.629 kg (8 lb) F CS-Unspec Spinal N FAMILIA      Complications: Failure to Progress in First Stage       Review of Systems  Review of Systems          Objective:    Physical Exam    Her incision looks very good.  Healing nicely  no evidence of infection    No sig abdominal tenderness     Pelvic exam  Vulva normal, Vagina normal,  Uterus is normal size, non tender and no adnexal masses.       Assessment:        1. 6 weeks postpartum follow-up                 Plan:         She is on her cycle today      She is to return for any reason.     Birth control is discussed  She is to follow up for her annual pap and exam.  This appointment is set with her before leaving today  Chaperone was present for the exam

## 2024-12-09 ENCOUNTER — PATIENT MESSAGE (OUTPATIENT)
Dept: OBSTETRICS AND GYNECOLOGY | Facility: CLINIC | Age: 39
End: 2024-12-09
Payer: COMMERCIAL

## 2024-12-09 DIAGNOSIS — R39.89 URINARY PROBLEM: Primary | ICD-10-CM

## 2024-12-09 DIAGNOSIS — B37.31 YEAST INFECTION OF THE VAGINA: ICD-10-CM

## 2024-12-10 RX ORDER — NITROFURANTOIN 25; 75 MG/1; MG/1
100 CAPSULE ORAL 2 TIMES DAILY
Qty: 14 CAPSULE | Refills: 0 | Status: SHIPPED | OUTPATIENT
Start: 2024-12-10 | End: 2024-12-17

## 2024-12-10 RX ORDER — FLUCONAZOLE 150 MG/1
TABLET ORAL
Qty: 2 TABLET | Refills: 2 | Status: SHIPPED | OUTPATIENT
Start: 2024-12-10

## 2025-01-06 ENCOUNTER — PATIENT MESSAGE (OUTPATIENT)
Dept: OBSTETRICS AND GYNECOLOGY | Facility: CLINIC | Age: 40
End: 2025-01-06
Payer: COMMERCIAL

## 2025-01-08 NOTE — TELEPHONE ENCOUNTER
This message has been addressed with a phone encounter  Patient is notified I do not have a document requesting the information she is referring to  I have a Physician Statement requesting Diagnosis of medical condition to bud the employee medical sabbatical leave.  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  Patient is advised I do not have a medical diagnosis to support requesting a medical leave.  I can use post partum recovery but I can not guarantee this will be sufficient   Patient acknowledges with verbal understanding.

## 2025-02-12 ENCOUNTER — PATIENT MESSAGE (OUTPATIENT)
Dept: OBSTETRICS AND GYNECOLOGY | Facility: CLINIC | Age: 40
End: 2025-02-12
Payer: COMMERCIAL

## 2025-02-14 ENCOUNTER — PATIENT MESSAGE (OUTPATIENT)
Dept: OBSTETRICS AND GYNECOLOGY | Facility: CLINIC | Age: 40
End: 2025-02-14
Payer: COMMERCIAL

## 2025-02-14 RX ORDER — BUSPIRONE HYDROCHLORIDE 7.5 MG/1
7.5 TABLET ORAL 3 TIMES DAILY PRN
Qty: 45 TABLET | Refills: 11 | Status: SHIPPED | OUTPATIENT
Start: 2025-02-14 | End: 2026-02-14

## 2025-04-14 ENCOUNTER — PATIENT MESSAGE (OUTPATIENT)
Dept: OBSTETRICS AND GYNECOLOGY | Facility: CLINIC | Age: 40
End: 2025-04-14
Payer: COMMERCIAL

## 2025-04-14 DIAGNOSIS — B37.31 YEAST INFECTION OF THE VAGINA: ICD-10-CM

## 2025-04-14 RX ORDER — FLUCONAZOLE 150 MG/1
TABLET ORAL
Qty: 2 TABLET | Refills: 2 | Status: SHIPPED | OUTPATIENT
Start: 2025-04-14

## 2025-04-14 NOTE — TELEPHONE ENCOUNTER
Patient c/o vaginal yeast infection. She is requesting diflucan and refill of BAS.   Patient request refill. Allergies reviewed. Pharmacy up to date. Medication pending. Please approve.

## 2025-05-12 ENCOUNTER — PATIENT MESSAGE (OUTPATIENT)
Dept: OBSTETRICS AND GYNECOLOGY | Facility: CLINIC | Age: 40
End: 2025-05-12
Payer: COMMERCIAL

## 2025-05-12 DIAGNOSIS — N76.0 BV (BACTERIAL VAGINOSIS): Primary | ICD-10-CM

## 2025-05-12 DIAGNOSIS — B96.89 BV (BACTERIAL VAGINOSIS): Primary | ICD-10-CM

## 2025-05-12 DIAGNOSIS — B37.31 VAGINAL YEAST INFECTION: Primary | ICD-10-CM

## 2025-05-12 DIAGNOSIS — B37.31 YEAST INFECTION OF THE VAGINA: ICD-10-CM

## 2025-05-12 DIAGNOSIS — B37.31 VAGINAL YEAST INFECTION: ICD-10-CM

## 2025-05-12 RX ORDER — FLUCONAZOLE 150 MG/1
TABLET ORAL
Qty: 2 TABLET | Refills: 2 | Status: SHIPPED | OUTPATIENT
Start: 2025-05-12

## 2025-05-12 NOTE — TELEPHONE ENCOUNTER
Patient states she was on an antibiotic for staph infection, she now has a yeast infection and requesting medication.  Patient request refill. Allergies reviewed. Pharmacy up to date. Medication pending. Please approve.

## 2025-05-19 RX ORDER — CLINDAMYCIN PHOSPHATE 100 MG/5G
CREAM VAGINAL ONCE
Qty: 5 G | Refills: 0 | Status: SHIPPED | OUTPATIENT
Start: 2025-05-19 | End: 2025-05-19

## 2025-05-19 RX ORDER — FLUCONAZOLE 150 MG/1
TABLET ORAL
Qty: 2 TABLET | Refills: 2 | Status: SHIPPED | OUTPATIENT
Start: 2025-05-19

## 2025-05-19 NOTE — TELEPHONE ENCOUNTER
Pt states the other pharmacy never received diflucan. Please re-send to different pharmacy along with refill on boric acid and clindesse for BV. She states these have worked well for her in the past.

## 2025-06-11 ENCOUNTER — PATIENT MESSAGE (OUTPATIENT)
Dept: OBSTETRICS AND GYNECOLOGY | Facility: CLINIC | Age: 40
End: 2025-06-11
Payer: COMMERCIAL

## 2025-06-11 DIAGNOSIS — R39.89 URINARY PROBLEM: Primary | ICD-10-CM

## 2025-06-11 DIAGNOSIS — B37.31 VAGINAL YEAST INFECTION: ICD-10-CM

## 2025-06-11 RX ORDER — NITROFURANTOIN 25; 75 MG/1; MG/1
100 CAPSULE ORAL 2 TIMES DAILY
Qty: 14 CAPSULE | Refills: 0 | Status: SHIPPED | OUTPATIENT
Start: 2025-06-11 | End: 2025-06-18

## 2025-06-11 RX ORDER — FLUCONAZOLE 150 MG/1
TABLET ORAL
Qty: 2 TABLET | Refills: 2 | Status: SHIPPED | OUTPATIENT
Start: 2025-06-11